# Patient Record
Sex: MALE | Race: WHITE | NOT HISPANIC OR LATINO | URBAN - METROPOLITAN AREA
[De-identification: names, ages, dates, MRNs, and addresses within clinical notes are randomized per-mention and may not be internally consistent; named-entity substitution may affect disease eponyms.]

---

## 2017-01-04 ENCOUNTER — HOSPITAL ENCOUNTER (OUTPATIENT)
Dept: FAMILY MEDICINE CLINIC | Facility: CLINIC | Age: 77
Setting detail: SPECIMEN
Discharge: HOME | End: 2017-01-04
Attending: PREVENTIVE MEDICINE | Admitting: PREVENTIVE MEDICINE

## 2017-01-04 LAB
ALBUMIN SERPL-MCNC: 3.8 G/DL (ref 3.5–4.8)
ALBUMIN/GLOB SERPL: 1.5 {RATIO} (ref 1–1.7)
ALP SERPL-CCNC: 53 IU/L (ref 32–91)
ALT SERPL-CCNC: 27 IU/L (ref 17–63)
ANION GAP SERPL CALC-SCNC: 10 MMOL/L (ref 10–20)
AST SERPL-CCNC: 30 IU/L (ref 15–41)
BASOPHILS # BLD AUTO: 0 10*3/UL (ref 0–0.2)
BASOPHILS NFR BLD AUTO: 1 % (ref 0–2)
BILIRUB SERPL-MCNC: 1.7 MG/DL (ref 0.3–1.2)
BUN SERPL-MCNC: 23 MG/DL (ref 8–20)
BUN/CREAT SERPL: 20.9 (ref 6.2–20.3)
CALCIUM SERPL-MCNC: 9.4 MG/DL (ref 8.9–10.3)
CHLORIDE SERPL-SCNC: 103 MMOL/L (ref 101–111)
CONV CO2: 28 MMOL/L (ref 22–32)
CONV TOTAL PROTEIN: 6.3 G/DL (ref 6.1–7.9)
CREAT UR-MCNC: 1.1 MG/DL (ref 0.7–1.2)
DIFFERENTIAL METHOD BLD: (no result)
EOSINOPHIL # BLD AUTO: 0.1 10*3/UL (ref 0–0.3)
EOSINOPHIL # BLD AUTO: 1 % (ref 0–3)
ERYTHROCYTE [DISTWIDTH] IN BLOOD BY AUTOMATED COUNT: 12.9 % (ref 11.5–14.5)
ERYTHROCYTE [SEDIMENTATION RATE] IN BLOOD BY WESTERGREN METHOD: 17 MM/HR (ref 0–20)
GLOBULIN UR ELPH-MCNC: 2.5 G/DL (ref 2.5–3.8)
GLUCOSE SERPL-MCNC: 108 MG/DL (ref 65–99)
HCT VFR BLD AUTO: 43.1 % (ref 40–54)
HGB BLD-MCNC: 14.8 G/DL (ref 14–18)
LYMPHOCYTES # BLD AUTO: 1.1 10*3/UL (ref 0.8–4.8)
LYMPHOCYTES NFR BLD AUTO: 21 % (ref 18–42)
MCH RBC QN AUTO: 33.2 PG (ref 26–32)
MCHC RBC AUTO-ENTMCNC: 34.3 G/DL (ref 32–36)
MCV RBC AUTO: 96.7 FL (ref 80–94)
MONOCYTES # BLD AUTO: 0.5 10*3/UL (ref 0.1–1.3)
MONOCYTES NFR BLD AUTO: 9 % (ref 2–11)
NEUTROPHILS # BLD AUTO: 3.6 10*3/UL (ref 2.3–8.6)
NEUTROPHILS NFR BLD AUTO: 68 % (ref 50–75)
NRBC BLD AUTO-RTO: 0 /100{WBCS}
NRBC/RBC NFR BLD MANUAL: 0 10*3/UL
PLATELET # BLD AUTO: 236 10*3/UL (ref 150–450)
PMV BLD AUTO: 8.7 FL (ref 7.4–10.4)
POTASSIUM SERPL-SCNC: 4 MMOL/L (ref 3.6–5.1)
RBC # BLD AUTO: 4.45 10*6/UL (ref 4.6–6)
SODIUM SERPL-SCNC: 137 MMOL/L (ref 136–144)
WBC # BLD AUTO: 5.3 10*3/UL (ref 4.5–11.5)

## 2017-01-05 ENCOUNTER — HOSPITAL ENCOUNTER (OUTPATIENT)
Dept: LAB | Facility: HOSPITAL | Age: 77
Discharge: HOME | End: 2017-01-05
Attending: PREVENTIVE MEDICINE | Admitting: PREVENTIVE MEDICINE

## 2017-01-05 LAB
BACTERIA SPEC AEROBE CULT: NORMAL
CONV TEST ORDERED:: NORMAL
CRYPTOSP AG STL QL IA: NEGATIVE
Lab: NORMAL
Lab: NORMAL
MICRO REPORT STATUS: NORMAL
SPECIMEN SOURCE: NORMAL

## 2017-01-06 ENCOUNTER — HOSPITAL ENCOUNTER (OUTPATIENT)
Dept: LAB | Facility: HOSPITAL | Age: 77
Discharge: HOME | End: 2017-01-06
Attending: PREVENTIVE MEDICINE | Admitting: PREVENTIVE MEDICINE

## 2017-01-06 LAB
CONV TEST ORDERED:: NORMAL
Lab: NORMAL

## 2017-11-21 ENCOUNTER — OFFICE (AMBULATORY)
Dept: URBAN - METROPOLITAN AREA CLINIC 64 | Facility: CLINIC | Age: 77
End: 2017-11-21

## 2017-11-21 VITALS
SYSTOLIC BLOOD PRESSURE: 150 MMHG | DIASTOLIC BLOOD PRESSURE: 82 MMHG | WEIGHT: 148 LBS | HEIGHT: 68 IN | HEART RATE: 54 BPM

## 2017-11-21 DIAGNOSIS — Z80.0 FAMILY HISTORY OF MALIGNANT NEOPLASM OF DIGESTIVE ORGANS: ICD-10-CM

## 2017-11-21 DIAGNOSIS — Z86.010 PERSONAL HISTORY OF COLONIC POLYPS: ICD-10-CM

## 2017-11-21 DIAGNOSIS — K62.5 HEMORRHAGE OF ANUS AND RECTUM: ICD-10-CM

## 2017-11-21 PROCEDURE — 99202 OFFICE O/P NEW SF 15 MIN: CPT

## 2017-12-07 ENCOUNTER — ON CAMPUS - OUTPATIENT (AMBULATORY)
Dept: URBAN - METROPOLITAN AREA HOSPITAL 2 | Facility: HOSPITAL | Age: 77
End: 2017-12-07

## 2017-12-07 VITALS
DIASTOLIC BLOOD PRESSURE: 58 MMHG | RESPIRATION RATE: 18 BRPM | HEART RATE: 45 BPM | SYSTOLIC BLOOD PRESSURE: 132 MMHG | HEART RATE: 46 BPM | SYSTOLIC BLOOD PRESSURE: 107 MMHG | OXYGEN SATURATION: 99 % | SYSTOLIC BLOOD PRESSURE: 105 MMHG | TEMPERATURE: 97.3 F | HEART RATE: 52 BPM | HEIGHT: 68 IN | DIASTOLIC BLOOD PRESSURE: 66 MMHG | RESPIRATION RATE: 16 BRPM | SYSTOLIC BLOOD PRESSURE: 98 MMHG | SYSTOLIC BLOOD PRESSURE: 155 MMHG | WEIGHT: 150 LBS | SYSTOLIC BLOOD PRESSURE: 89 MMHG | HEART RATE: 50 BPM | DIASTOLIC BLOOD PRESSURE: 79 MMHG | OXYGEN SATURATION: 96 % | SYSTOLIC BLOOD PRESSURE: 136 MMHG | HEART RATE: 57 BPM | OXYGEN SATURATION: 100 % | DIASTOLIC BLOOD PRESSURE: 55 MMHG | DIASTOLIC BLOOD PRESSURE: 51 MMHG

## 2017-12-07 DIAGNOSIS — K62.5 HEMORRHAGE OF ANUS AND RECTUM: ICD-10-CM

## 2017-12-07 DIAGNOSIS — Z86.010 PERSONAL HISTORY OF COLONIC POLYPS: ICD-10-CM

## 2017-12-07 DIAGNOSIS — K64.8 OTHER HEMORRHOIDS: ICD-10-CM

## 2017-12-07 PROCEDURE — 45378 DIAGNOSTIC COLONOSCOPY: CPT

## 2017-12-07 RX ADMIN — PROPOFOL: 10 INJECTION, EMULSION INTRAVENOUS at 07:30

## 2018-11-23 ENCOUNTER — HOSPITAL ENCOUNTER (OUTPATIENT)
Dept: ULTRASOUND IMAGING | Facility: HOSPITAL | Age: 78
Discharge: HOME | End: 2018-11-23
Attending: PREVENTIVE MEDICINE | Admitting: PREVENTIVE MEDICINE

## 2019-06-01 ENCOUNTER — TRANSCRIBE ORDERS (OUTPATIENT)
Dept: CARDIOLOGY | Facility: CLINIC | Age: 79
End: 2019-06-01

## 2019-06-01 DIAGNOSIS — R07.9 CHEST PAIN, UNSPECIFIED TYPE: Primary | ICD-10-CM

## 2019-06-28 ENCOUNTER — LAB (OUTPATIENT)
Dept: LAB | Facility: HOSPITAL | Age: 79
End: 2019-06-28

## 2019-06-28 ENCOUNTER — TRANSCRIBE ORDERS (OUTPATIENT)
Dept: ADMINISTRATIVE | Facility: HOSPITAL | Age: 79
End: 2019-06-28

## 2019-06-28 DIAGNOSIS — E87.6 HYPOKALEMIA: ICD-10-CM

## 2019-06-28 DIAGNOSIS — I10 HYPERTENSION, UNSPECIFIED TYPE: ICD-10-CM

## 2019-06-28 DIAGNOSIS — E87.6 HYPOKALEMIA: Primary | ICD-10-CM

## 2019-06-28 LAB
ANION GAP SERPL CALCULATED.3IONS-SCNC: 14.3 MMOL/L (ref 10–20)
BUN BLD-MCNC: 25 MG/DL (ref 8–20)
BUN/CREAT SERPL: 22.7 (ref 6.2–20.3)
CALCIUM SPEC-SCNC: 9.2 MG/DL (ref 8.9–10.3)
CHLORIDE SERPL-SCNC: 105 MMOL/L (ref 101–111)
CO2 SERPL-SCNC: 25 MMOL/L (ref 22–32)
CREAT BLD-MCNC: 1.1 MG/DL (ref 0.7–1.2)
GFR SERPL CREATININE-BSD FRML MDRD: 65 ML/MIN/1.73
GLUCOSE BLD-MCNC: 145 MG/DL (ref 65–99)
POTASSIUM BLD-SCNC: 4.3 MMOL/L (ref 3.6–5.1)
SODIUM BLD-SCNC: 140 MMOL/L (ref 136–144)

## 2019-06-28 PROCEDURE — 36415 COLL VENOUS BLD VENIPUNCTURE: CPT

## 2019-06-28 PROCEDURE — 80048 BASIC METABOLIC PNL TOTAL CA: CPT

## 2019-06-28 RX ORDER — ASPIRIN 81 MG/1
81 TABLET ORAL DAILY
COMMUNITY
Start: 2016-06-23

## 2019-06-28 RX ORDER — PREDNISOLONE ACETATE 10 MG/ML
1 SUSPENSION/ DROPS OPHTHALMIC DAILY
Refills: 0 | COMMUNITY
Start: 2019-06-04

## 2019-06-28 RX ORDER — PHENOL 1.4 %
600 AEROSOL, SPRAY (ML) MUCOUS MEMBRANE DAILY
COMMUNITY
Start: 2017-01-04

## 2019-06-28 RX ORDER — TELMISARTAN AND HYDROCHLORTHIAZIDE 80; 12.5 MG/1; MG/1
1 TABLET ORAL DAILY
Refills: 2 | COMMUNITY
Start: 2019-04-02

## 2019-06-28 RX ORDER — CHOLECALCIFEROL (VITAMIN D3) 25 MCG
1 CAPSULE ORAL EVERY 24 HOURS
COMMUNITY
Start: 2018-12-07

## 2019-06-28 RX ORDER — QUINIDINE SULFATE 200 MG
TABLET ORAL
COMMUNITY
Start: 2017-01-04

## 2019-06-28 RX ORDER — FLUOROMETHOLONE 0.1 %
0.1 SUSPENSION, DROPS(FINAL DOSAGE FORM)(ML) OPHTHALMIC (EYE) DAILY
Refills: 0 | COMMUNITY
Start: 2019-05-14 | End: 2019-06-28 | Stop reason: SDUPTHER

## 2019-06-28 RX ORDER — MULTIVIT-MIN/FA/LYCOPEN/LUTEIN .4-300-25
TABLET ORAL
COMMUNITY
Start: 2016-06-23

## 2019-06-28 RX ORDER — OMEGA-3 FATTY ACIDS CAP DELAYED RELEASE 1000 MG 1000 MG
CAPSULE DELAYED RELEASE ORAL
COMMUNITY
Start: 2016-06-23

## 2019-07-01 ENCOUNTER — HOSPITAL ENCOUNTER (OUTPATIENT)
Dept: CARDIOLOGY | Facility: HOSPITAL | Age: 79
Discharge: HOME | End: 2019-07-01
Admitting: INTERNAL MEDICINE

## 2019-07-01 ENCOUNTER — OFFICE VISIT (OUTPATIENT)
Dept: CARDIOLOGY | Facility: CLINIC | Age: 79
End: 2019-07-01

## 2019-07-01 VITALS
BODY MASS INDEX: 23.04 KG/M2 | SYSTOLIC BLOOD PRESSURE: 183 MMHG | HEIGHT: 68 IN | HEART RATE: 50 BPM | OXYGEN SATURATION: 98 % | WEIGHT: 152 LBS | DIASTOLIC BLOOD PRESSURE: 96 MMHG

## 2019-07-01 VITALS
SYSTOLIC BLOOD PRESSURE: 155 MMHG | BODY MASS INDEX: 21.98 KG/M2 | HEIGHT: 68 IN | WEIGHT: 145 LBS | DIASTOLIC BLOOD PRESSURE: 55 MMHG | HEART RATE: 54 BPM

## 2019-07-01 DIAGNOSIS — I47.1 PSVT (PAROXYSMAL SUPRAVENTRICULAR TACHYCARDIA) (HCC): ICD-10-CM

## 2019-07-01 DIAGNOSIS — I10 ESSENTIAL HYPERTENSION: ICD-10-CM

## 2019-07-01 DIAGNOSIS — R00.2 PALPITATIONS: Primary | ICD-10-CM

## 2019-07-01 PROCEDURE — 99213 OFFICE O/P EST LOW 20 MIN: CPT | Performed by: INTERNAL MEDICINE

## 2019-07-01 PROCEDURE — 93306 TTE W/DOPPLER COMPLETE: CPT

## 2019-07-03 LAB
BH CV ECHO MEAS - AI DEC SLOPE: 137.3 CM/SEC^2
BH CV ECHO MEAS - AI DEC TIME: 3.2 SEC
BH CV ECHO MEAS - AI MAX PG: 78.8 MMHG
BH CV ECHO MEAS - AI MAX VEL: 443.7 CM/SEC
BH CV ECHO MEAS - AI P1/2T: 946.3 MSEC
BH CV ECHO MEAS - AO MAX PG (FULL): 2 MMHG
BH CV ECHO MEAS - AO MAX PG: 8.2 MMHG
BH CV ECHO MEAS - AO MEAN PG (FULL): 1.7 MMHG
BH CV ECHO MEAS - AO MEAN PG: 4.5 MMHG
BH CV ECHO MEAS - AO ROOT AREA (BSA CORRECTED): 2.1
BH CV ECHO MEAS - AO ROOT AREA: 11 CM^2
BH CV ECHO MEAS - AO ROOT DIAM: 3.7 CM
BH CV ECHO MEAS - AO V2 MAX: 143.5 CM/SEC
BH CV ECHO MEAS - AO V2 MEAN: 102.2 CM/SEC
BH CV ECHO MEAS - AO V2 VTI: 31.8 CM
BH CV ECHO MEAS - ASC AORTA: 2.8 CM
BH CV ECHO MEAS - AVA(I,A): 3.1 CM^2
BH CV ECHO MEAS - AVA(I,D): 3.1 CM^2
BH CV ECHO MEAS - AVA(V,A): 3.1 CM^2
BH CV ECHO MEAS - AVA(V,D): 3.1 CM^2
BH CV ECHO MEAS - BSA(HAYCOCK): 1.8 M^2
BH CV ECHO MEAS - BSA: 1.8 M^2
BH CV ECHO MEAS - BZI_BMI: 22 KILOGRAMS/M^2
BH CV ECHO MEAS - BZI_METRIC_HEIGHT: 172.7 CM
BH CV ECHO MEAS - BZI_METRIC_WEIGHT: 65.8 KG
BH CV ECHO MEAS - EDV(CUBED): 57 ML
BH CV ECHO MEAS - EDV(MOD-SP2): 33.5 ML
BH CV ECHO MEAS - EDV(MOD-SP4): 52.2 ML
BH CV ECHO MEAS - EDV(TEICH): 63.9 ML
BH CV ECHO MEAS - EF(CUBED): 36.8 %
BH CV ECHO MEAS - EF(MOD-BP): 57 %
BH CV ECHO MEAS - EF(MOD-SP2): 54.9 %
BH CV ECHO MEAS - EF(MOD-SP4): 55.2 %
BH CV ECHO MEAS - EF(TEICH): 30.8 %
BH CV ECHO MEAS - ESV(CUBED): 36 ML
BH CV ECHO MEAS - ESV(MOD-SP2): 15.1 ML
BH CV ECHO MEAS - ESV(MOD-SP4): 23.4 ML
BH CV ECHO MEAS - ESV(TEICH): 44.2 ML
BH CV ECHO MEAS - FS: 14.2 %
BH CV ECHO MEAS - IVS/LVPW: 1.2
BH CV ECHO MEAS - IVSD: 1.6 CM
BH CV ECHO MEAS - LA DIMENSION(2D): 3.5 CM
BH CV ECHO MEAS - LV DIASTOLIC VOL/BSA (35-75): 29.3 ML/M^2
BH CV ECHO MEAS - LV MASS(C)D: 206.5 GRAMS
BH CV ECHO MEAS - LV MASS(C)DI: 115.8 GRAMS/M^2
BH CV ECHO MEAS - LV MAX PG: 6.2 MMHG
BH CV ECHO MEAS - LV MEAN PG: 2.8 MMHG
BH CV ECHO MEAS - LV SYSTOLIC VOL/BSA (12-30): 13.1 ML/M^2
BH CV ECHO MEAS - LV V1 MAX: 124.4 CM/SEC
BH CV ECHO MEAS - LV V1 MEAN: 75.7 CM/SEC
BH CV ECHO MEAS - LV V1 VTI: 28 CM
BH CV ECHO MEAS - LVIDD: 3.8 CM
BH CV ECHO MEAS - LVIDS: 3.3 CM
BH CV ECHO MEAS - LVOT AREA: 3.5 CM^2
BH CV ECHO MEAS - LVOT DIAM: 2.1 CM
BH CV ECHO MEAS - LVPWD: 1.3 CM
BH CV ECHO MEAS - MR MAX PG: 139.4 MMHG
BH CV ECHO MEAS - MR MAX VEL: 590.3 CM/SEC
BH CV ECHO MEAS - MR MEAN PG: 113.5 MMHG
BH CV ECHO MEAS - MR MEAN VEL: 522.9 CM/SEC
BH CV ECHO MEAS - MR VTI: 246.1 CM
BH CV ECHO MEAS - MV A MAX VEL: 88 CM/SEC
BH CV ECHO MEAS - MV DEC SLOPE: 347.4 CM/SEC^2
BH CV ECHO MEAS - MV DEC TIME: 0.22 SEC
BH CV ECHO MEAS - MV E MAX VEL: 75 CM/SEC
BH CV ECHO MEAS - MV E/A: 0.85
BH CV ECHO MEAS - MV MAX PG: 4.4 MMHG
BH CV ECHO MEAS - MV MEAN PG: 1.5 MMHG
BH CV ECHO MEAS - MV V2 MAX: 104.5 CM/SEC
BH CV ECHO MEAS - MV V2 MEAN: 54.7 CM/SEC
BH CV ECHO MEAS - MV V2 VTI: 38.8 CM
BH CV ECHO MEAS - MVA(VTI): 2.6 CM^2
BH CV ECHO MEAS - PA MAX PG: 3 MMHG
BH CV ECHO MEAS - PA MEAN PG: 2 MMHG
BH CV ECHO MEAS - PA V2 MAX: 87.2 CM/SEC
BH CV ECHO MEAS - PA V2 MEAN: 67.6 CM/SEC
BH CV ECHO MEAS - PA V2 VTI: 21.7 CM
BH CV ECHO MEAS - PI END-D VEL: 78.9 CM/SEC
BH CV ECHO MEAS - PI MAX PG: 13.3 MMHG
BH CV ECHO MEAS - PI MAX VEL: 182.5 CM/SEC
BH CV ECHO MEAS - RAP SYSTOLE: 5 MMHG
BH CV ECHO MEAS - RVDD: 3.5 CM
BH CV ECHO MEAS - RVSP: 24.1 MMHG
BH CV ECHO MEAS - SI(AO): 195.8 ML/M^2
BH CV ECHO MEAS - SI(CUBED): 11.8 ML/M^2
BH CV ECHO MEAS - SI(LVOT): 55.5 ML/M^2
BH CV ECHO MEAS - SI(MOD-SP2): 10.3 ML/M^2
BH CV ECHO MEAS - SI(MOD-SP4): 16.1 ML/M^2
BH CV ECHO MEAS - SI(TEICH): 11 ML/M^2
BH CV ECHO MEAS - SV(AO): 349 ML
BH CV ECHO MEAS - SV(CUBED): 21 ML
BH CV ECHO MEAS - SV(LVOT): 99 ML
BH CV ECHO MEAS - SV(MOD-SP2): 18.4 ML
BH CV ECHO MEAS - SV(MOD-SP4): 28.8 ML
BH CV ECHO MEAS - SV(TEICH): 19.7 ML
BH CV ECHO MEAS - TR MAX VEL: 218.4 CM/SEC
LV EF 2D ECHO EST: 60 %
MAXIMAL PREDICTED HEART RATE: 142 BPM
STRESS TARGET HR: 121 BPM

## 2019-07-03 PROCEDURE — 93306 TTE W/DOPPLER COMPLETE: CPT | Performed by: INTERNAL MEDICINE

## 2020-05-28 ENCOUNTER — OFFICE VISIT (OUTPATIENT)
Dept: FAMILY MEDICINE CLINIC | Facility: CLINIC | Age: 80
End: 2020-05-28

## 2020-05-28 VITALS
HEIGHT: 68 IN | BODY MASS INDEX: 22.52 KG/M2 | WEIGHT: 148.6 LBS | OXYGEN SATURATION: 98 % | SYSTOLIC BLOOD PRESSURE: 170 MMHG | DIASTOLIC BLOOD PRESSURE: 89 MMHG | TEMPERATURE: 96.9 F | RESPIRATION RATE: 16 BRPM | HEART RATE: 79 BPM

## 2020-05-28 DIAGNOSIS — I10 ESSENTIAL HYPERTENSION: Primary | ICD-10-CM

## 2020-05-28 DIAGNOSIS — J30.1 SEASONAL ALLERGIC RHINITIS DUE TO POLLEN: ICD-10-CM

## 2020-05-28 DIAGNOSIS — Z85.46 PERSONAL HISTORY OF PROSTATE CANCER: ICD-10-CM

## 2020-05-28 DIAGNOSIS — H69.81 EUSTACHIAN TUBE DYSFUNCTION, RIGHT: ICD-10-CM

## 2020-05-28 DIAGNOSIS — R73.9 HYPERGLYCEMIA: ICD-10-CM

## 2020-05-28 DIAGNOSIS — E78.5 HYPERLIPIDEMIA, UNSPECIFIED HYPERLIPIDEMIA TYPE: ICD-10-CM

## 2020-05-28 LAB — HBA1C MFR BLD: 5.2 % (ref 3.5–5.6)

## 2020-05-28 PROCEDURE — 83036 HEMOGLOBIN GLYCOSYLATED A1C: CPT | Performed by: NURSE PRACTITIONER

## 2020-05-28 PROCEDURE — 80061 LIPID PANEL: CPT | Performed by: NURSE PRACTITIONER

## 2020-05-28 PROCEDURE — 85025 COMPLETE CBC W/AUTO DIFF WBC: CPT | Performed by: NURSE PRACTITIONER

## 2020-05-28 PROCEDURE — 80053 COMPREHEN METABOLIC PANEL: CPT | Performed by: NURSE PRACTITIONER

## 2020-05-28 PROCEDURE — 99214 OFFICE O/P EST MOD 30 MIN: CPT | Performed by: NURSE PRACTITIONER

## 2020-05-28 PROCEDURE — G0439 PPPS, SUBSEQ VISIT: HCPCS | Performed by: NURSE PRACTITIONER

## 2020-05-28 PROCEDURE — 36415 COLL VENOUS BLD VENIPUNCTURE: CPT | Performed by: NURSE PRACTITIONER

## 2020-05-28 RX ORDER — AZELASTINE 1 MG/ML
1 SPRAY, METERED NASAL 2 TIMES DAILY
COMMUNITY
Start: 2020-03-11

## 2020-05-28 RX ORDER — TACROLIMUS 1 MG/G
OINTMENT TOPICAL
COMMUNITY
Start: 2019-09-25

## 2020-05-28 NOTE — PROGRESS NOTES
"Subjective   Anthony Lugo is a 80 y.o. male presents for   Chief Complaint   Patient presents with   • Medicare Wellness-subsequent   • Hypertension   • Hyperlipidemia       Health Maintenance Due   Topic Date Due   • TDAP/TD VACCINES (1 - Tdap) 01/18/1951   • Pneumococcal Vaccine Once at 65 Years Old  01/18/2006   • ZOSTER VACCINE (1 of 2) 11/20/2007   • HEPATITIS C SCREENING  06/20/2019   • LIPID PANEL  11/14/2019       History of Present Illness   Pt present for follow up HTN, Hyperlipidemia and medicare wellness.  Pt reports checking bloodpressure at home and states it is typically 130s/70s but runs high when coming to the office.  Pt also reports he is seen at [facility] yearly and is scheduled in August for yearly follow up.  Pt reports recent flare-up with allergy and sinus symptoms at was treated with Z-pack last month. He also reports experiencing chest pain in [place] in March and was evaluated and diagnosed with GERD.  He states symptoms are stable on PPI.  He reports feeling well at this time.  Vitals:    05/28/20 0843 05/28/20 0844   BP: 163/90 170/89   BP Location: Left arm Right arm   Patient Position: Sitting Sitting   Cuff Size: Adult Adult   Pulse: 56 79   Resp: 16    Temp: 96.9 °F (36.1 °C)    TempSrc: Temporal    SpO2: 98%    Weight: 67.4 kg (148 lb 9.6 oz)    Height: 172 cm (67.72\")      Body mass index is 22.78 kg/m².    Current Outpatient Medications on File Prior to Visit   Medication Sig Dispense Refill   • aspirin 81 MG EC tablet Take 81 mg by mouth Daily.     • azelastine (ASTELIN) 0.1 % nasal spray 1 spray into the nostril(s) as directed by provider 2 (Two) Times a Day.     • calcium carbonate (CALCIUM 600) 600 MG tablet Take 600 mg by mouth Daily.     • Cholecalciferol (VITAMIN D-3) 1000 units capsule 1 capsule Daily.     • Co-Enzyme Q-10 100 MG capsule Take 100 mg by mouth Daily.     • Multiple Vitamins-Minerals (CENTRUM SILVER ADULT 50+) tablet CENTRUM SILVER ADULT 50+ TABS     • " Omega-3 Fatty Acids (FISH OIL) 1000 MG capsule delayed-release FISH OIL 1000 MG CPDR     • prednisoLONE acetate (PRED FORTE) 1 % ophthalmic suspension Apply 1 % to eye(s) as directed by provider Daily.  0   • Specialty Vitamins Products (HEALTHY HEART COMPLEX) tablet HEALTHY HEART COMPLEX TABS     • tacrolimus (PROTOPIC) 0.1 % ointment Apply  topically to the appropriate area as directed.     • telmisartan-hydrochlorothiazide (MICARDIS HCT) 80-12.5 MG per tablet Take 1 tablet by mouth Daily.  2     No current facility-administered medications on file prior to visit.        The following portions of the patient's history were reviewed and updated as appropriate: allergies, current medications, past family history, past medical history, past social history, past surgical history and problem list.    Review of Systems   Constitutional: Negative for chills and fever.   HENT: Negative for sinus pressure and sore throat.    Eyes: Negative for blurred vision.   Respiratory: Negative for cough and shortness of breath.    Cardiovascular: Negative for chest pain.   Gastrointestinal: Positive for GERD. Negative for abdominal pain.   Endocrine: Negative.    Genitourinary: Negative.    Musculoskeletal: Negative for arthralgias and joint swelling.   Skin: Negative for color change.   Allergic/Immunologic: Positive for environmental allergies.   Neurological: Negative for dizziness.   Psychiatric/Behavioral: Negative for behavioral problems.       Objective   Physical Exam   Constitutional: He is oriented to person, place, and time. He appears well-developed and well-nourished.   HENT:   Head: Normocephalic and atraumatic.   Right Ear: Hearing, tympanic membrane, external ear and ear canal normal.   Left Ear: Hearing, external ear and ear canal normal. A middle ear effusion is present.   Nose: Nose normal.   Eyes: Pupils are equal, round, and reactive to light. Conjunctivae and EOM are normal.   Neck: Normal range of motion. Neck  supple. No thyromegaly present.   Cardiovascular: Normal rate, regular rhythm, normal heart sounds and intact distal pulses.   Pulmonary/Chest: Effort normal and breath sounds normal.   Abdominal: Soft. Bowel sounds are normal.   Musculoskeletal: Normal range of motion.   Lymphadenopathy:     He has no cervical adenopathy.   Neurological: He is alert and oriented to person, place, and time.   Skin: Skin is warm and dry.   Psychiatric: He has a normal mood and affect. His behavior is normal. Judgment and thought content normal.   Nursing note and vitals reviewed.    PHQ-9 Total Score: 0    Assessment/Plan   Anthony was seen today for medicare wellness-subsequent, hypertension and hyperlipidemia.    Diagnoses and all orders for this visit:    Essential hypertension  Comments:  monitor at home and call readings to office, DASH diet encouraged    Hyperlipidemia, unspecified hyperlipidemia type    Hyperglycemia    Personal history of prostate cancer    Eustachian tube dysfunction, right  Comments:  zyrtec prn    Seasonal allergic rhinitis due to pollen  Comments:  zyrtec prn, sinus rinses encouraged        Patient Instructions   Monitor BP at home x 2 weeks and call results to office    How to Perform a Sinus Rinse  A sinus rinse is a home treatment. It rinses your sinuses with a mixture of salt and water (saline solution). Sinuses are air-filled spaces in your skull behind the bones of your face and forehead. They open into your nasal cavity.  A sinus rinse can help to clear your nasal cavity. It can clear mucus, dirt, dust, or pollen.  You may do a sinus rinse when you have:  · A cold.  · A virus.  · Allergies.  · A sinus infection.  · A stuffy nose.  Talk with your doctor about whether a sinus rinse might help you.  What are the risks?  A sinus rinse is normally very safe and helpful. However, there are a few risks. These include:  · A burning feeling in the sinuses. This may happen if you do not make the saline  solution as instructed. Be sure to follow all directions when making the saline solution.  · Nasal irritation.  · Infection from unclean water. This is rare, but possible.  Do not do a sinus rinse if you have had:  · Ear or nasal surgery.  · An ear infection.  · Blocked ears.  Supplies needed:  · Saline solution or powder.  · Distilled or germ-free (sterile) water may be needed to mix with saline powder.  ? You may use boiled and cooled tap water. Boil tap water for 5 minutes; cool until it is lukewarm. Use within 24 hours.  ? Do not use regular tap water to mix with the saline solution.  · Neti pot or nasal rinse bottle. This releases the saline solution into your nose and through your sinuses. You can buy neti pots and rinse bottles:  ? At your local pharmacy.  ? At a [employer] food store.  ? Online.  How to perform a sinus rinse    1. Wash your hands with soap and water.  2. Wash your device using the directions that came with it.  3. Dry your device.  4. Use the solution that comes with your device or one that is sold separately in stores. Follow the mixing directions on the package if you need to mix with sterile or distilled water.  5. Fill your device with the amount of saline solution stated in the device instructions.  6. Stand over a sink and tilt your head sideways over the sink.  7. Place the spout of the device in your upper nostril (the one closer to the ceiling).  8. Gently pour or squeeze the saline solution into your nasal cavity. The liquid should drain to your lower nostril if you are not too stuffed up (congested).  9. While rinsing, breathe through your open mouth.  10. Gently blow your nose to clear any mucus and rinse solution. Blowing too hard may cause ear pain.  11. Repeat in your other nostril.  12. Clean and rinse your device with clean water.  13. Air-dry your device.  Talk with your doctor or pharmacist if you have questions about how to do a sinus rinse.  Summary  · A sinus rinse is a  home treatment. It rinses your sinuses with a mixture of salt and water (saline solution).  · A sinus rinse is normally very safe and helpful. Follow all instructions carefully.  · Talk with your doctor about whether a sinus rinse might help you.  This information is not intended to replace advice given to you by your health care provider. Make sure you discuss any questions you have with your health care provider.  Document Released: 07/15/2015 Document Revised: 10/15/2018 Document Reviewed: 10/15/2018  Elsevier Patient Education © 2020 [employer]

## 2020-05-28 NOTE — PROGRESS NOTES
The ABCs of the Annual Wellness Visit  Subsequent Medicare Wellness Visit    Chief Complaint   Patient presents with   • Medicare Wellness-subsequent   • Hypertension   • Hyperlipidemia       Subjective   History of Present Illness:  Anthony Lugo is a 80 y.o. male who presents for a Subsequent Medicare Wellness Visit.    HEALTH RISK ASSESSMENT    Recent Hospitalizations:  No hospitalization(s) within the last year.    Current Medical Providers:  Patient Care Team:  Valeri Villanueva MD as PCP - General  León Adame MD as Consulting Physician (Cardiology)    Smoking Status:  Social History     Tobacco Use   Smoking Status Never Smoker   Smokeless Tobacco Never Used       Alcohol Consumption:  Social History     Substance and Sexual Activity   Alcohol Use Yes    Comment: Social       Depression Screen:   PHQ-2/PHQ-9 Depression Screening 5/28/2020   Little interest or pleasure in doing things 0   Feeling down, depressed, or hopeless 0   Total Score 0       Fall Risk Screen:  RIKKI Fall Risk Assessment was completed, and patient is at LOW risk for falls.Assessment completed on:5/28/2020    Health Habits and Functional and Cognitive Screening:  Functional & Cognitive Status 5/28/2020   Do you have difficulty preparing food and eating? No   Do you have difficulty bathing yourself, getting dressed or grooming yourself? No   Do you have difficulty using the toilet? No   Do you have difficulty moving around from place to place? No   Do you have trouble with steps or getting out of a bed or a chair? No   Current Diet Well Balanced Diet   Dental Exam Up to date   Eye Exam Up to date   Exercise (times per week) 7 times per week   Current Exercise Activities Include Walking   Do you need help using the phone?  No   Are you deaf or do you have serious difficulty hearing?  Yes   Do you need help with transportation? No   Do you need help shopping? No   Do you need help preparing meals?  No   Do you need  help with housework?  No   Do you need help with laundry? No   Do you need help taking your medications? No   Do you need help managing money? No   Do you ever drive or ride in a car without wearing a seat belt? No   Have you felt unusual stress, anger or loneliness in the last month? No   Who do you live with? Spouse   If you need help, do you have trouble finding someone available to you? No   Have you been bothered in the last four weeks by sexual problems? No   Do you have difficulty concentrating, remembering or making decisions? No         Does the patient have evidence of cognitive impairment? Yes    Asprin use counseling:Taking ASA appropriately as indicated    Age-appropriate Screening Schedule:  Refer to the list below for future screening recommendations based on patient's age, sex and/or medical conditions. Orders for these recommended tests are listed in the plan section. The patient has been provided with a written plan.    Health Maintenance   Topic Date Due   • TDAP/TD VACCINES (1 - Tdap) 01/18/1951   • ZOSTER VACCINE (1 of 2) 11/20/2007   • LIPID PANEL  11/14/2019   • INFLUENZA VACCINE  08/01/2020          The following portions of the patient's history were reviewed and updated as appropriate: allergies, current medications, past family history, past medical history, past social history, past surgical history and problem list.    Outpatient Medications Prior to Visit   Medication Sig Dispense Refill   • aspirin 81 MG EC tablet Take 81 mg by mouth Daily.     • azelastine (ASTELIN) 0.1 % nasal spray 1 spray into the nostril(s) as directed by provider 2 (Two) Times a Day.     • calcium carbonate (CALCIUM 600) 600 MG tablet Take 600 mg by mouth Daily.     • Cholecalciferol (VITAMIN D-3) 1000 units capsule 1 capsule Daily.     • Co-Enzyme Q-10 100 MG capsule Take 100 mg by mouth Daily.     • Multiple Vitamins-Minerals (CENTRUM SILVER ADULT 50+) tablet CENTRUM SILVER ADULT 50+ TABS     • Omega-3 Fatty  "Acids (FISH OIL) 1000 MG capsule delayed-release FISH OIL 1000 MG CPDR     • prednisoLONE acetate (PRED FORTE) 1 % ophthalmic suspension Apply 1 % to eye(s) as directed by provider Daily.  0   • Specialty Vitamins Products (HEALTHY HEART COMPLEX) tablet HEALTHY HEART COMPLEX TABS     • tacrolimus (PROTOPIC) 0.1 % ointment Apply  topically to the appropriate area as directed.     • telmisartan-hydrochlorothiazide (MICARDIS HCT) 80-12.5 MG per tablet Take 1 tablet by mouth Daily.  2     No facility-administered medications prior to visit.        Patient Active Problem List   Diagnosis   • Hyperglycemia   • Hypertension   • Hyperlipidemia   • Personal history of prostate cancer       Advanced Care Planning:  ACP discussion was held with the patient during this visit. Patient has an advance directive in EMR which is still valid.     Review of Systems    Compared to one year ago, the patient feels his physical health is better.  Compared to one year ago, the patient feels his mental health is the same.    Reviewed chart for potential of high risk medication in the elderly: yes  Reviewed chart for potential of harmful drug interactions in the elderly:yes    Objective         Vitals:    05/28/20 0843 05/28/20 0844   BP: 163/90 170/89   BP Location: Left arm Right arm   Patient Position: Sitting Sitting   Cuff Size: Adult Adult   Pulse: 56 79   Resp: 16    Temp: 96.9 °F (36.1 °C)    TempSrc: Temporal    SpO2: 98%    Weight: 67.4 kg (148 lb 9.6 oz)    Height: 172 cm (67.72\")    PainSc: 0-No pain        Body mass index is 22.78 kg/m².  Discussed the patient's BMI with him. The BMI is in the acceptable range.    Physical Exam          Assessment/Plan   Medicare Risks and Personalized Health Plan  CMS Preventative Services Quick Reference  Cardiovascular risk  Depression/Dysphoria  Fall Risk  Hearing Problem    The above risks/problems have been discussed with the patient.  Pertinent information has been shared with the patient " in the After Visit Summary.  Follow up plans and orders are seen below in the Assessment/Plan Section.    Diagnoses and all orders for this visit:    1. Essential hypertension (Primary)    2. Hyperlipidemia, unspecified hyperlipidemia type    3. Hyperglycemia    4. Personal history of prostate cancer      Follow Up:  No follow-ups on file.     An After Visit Summary and PPPS were given to the patient.

## 2020-05-28 NOTE — PATIENT INSTRUCTIONS
Monitor BP at home x 2 weeks and call results to office    How to Perform a Sinus Rinse  A sinus rinse is a home treatment. It rinses your sinuses with a mixture of salt and water (saline solution). Sinuses are air-filled spaces in your skull behind the bones of your face and forehead. They open into your nasal cavity.  A sinus rinse can help to clear your nasal cavity. It can clear mucus, dirt, dust, or pollen.  You may do a sinus rinse when you have:  · A cold.  · A virus.  · Allergies.  · A sinus infection.  · A stuffy nose.  Talk with your doctor about whether a sinus rinse might help you.  What are the risks?  A sinus rinse is normally very safe and helpful. However, there are a few risks. These include:  · A burning feeling in the sinuses. This may happen if you do not make the saline solution as instructed. Be sure to follow all directions when making the saline solution.  · Nasal irritation.  · Infection from unclean water. This is rare, but possible.  Do not do a sinus rinse if you have had:  · Ear or nasal surgery.  · An ear infection.  · Blocked ears.  Supplies needed:  · Saline solution or powder.  · Distilled or germ-free (sterile) water may be needed to mix with saline powder.  ? You may use boiled and cooled tap water. Boil tap water for 5 minutes; cool until it is lukewarm. Use within 24 hours.  ? Do not use regular tap water to mix with the saline solution.  · Neti pot or nasal rinse bottle. This releases the saline solution into your nose and through your sinuses. You can buy neti pots and rinse bottles:  ? At your local pharmacy.  ? At a health food store.  ? Online.  How to perform a sinus rinse    1. Wash your hands with soap and water.  2. Wash your device using the directions that came with it.  3. Dry your device.  4. Use the solution that comes with your device or one that is sold separately in stores. Follow the mixing directions on the package if you need to mix with sterile or distilled  water.  5. Fill your device with the amount of saline solution stated in the device instructions.  6. Stand over a sink and tilt your head sideways over the sink.  7. Place the spout of the device in your upper nostril (the one closer to the ceiling).  8. Gently pour or squeeze the saline solution into your nasal cavity. The liquid should drain to your lower nostril if you are not too stuffed up (congested).  9. While rinsing, breathe through your open mouth.  10. Gently blow your nose to clear any mucus and rinse solution. Blowing too hard may cause ear pain.  11. Repeat in your other nostril.  12. Clean and rinse your device with clean water.  13. Air-dry your device.  Talk with your doctor or pharmacist if you have questions about how to do a sinus rinse.  Summary  · A sinus rinse is a home treatment. It rinses your sinuses with a mixture of salt and water (saline solution).  · A sinus rinse is normally very safe and helpful. Follow all instructions carefully.  · Talk with your doctor about whether a sinus rinse might help you.  This information is not intended to replace advice given to you by your health care provider. Make sure you discuss any questions you have with your health care provider.  Document Released: 07/15/2015 Document Revised: 10/15/2018 Document Reviewed: 10/15/2018  Elsevier Patient Education © 2020 [employer]

## 2020-05-29 LAB
ALBUMIN SERPL-MCNC: 4.3 G/DL (ref 3.5–5.2)
ALBUMIN/GLOB SERPL: 1.9 G/DL
ALP SERPL-CCNC: 55 U/L (ref 39–117)
ALT SERPL W P-5'-P-CCNC: 20 U/L (ref 1–41)
ANION GAP SERPL CALCULATED.3IONS-SCNC: 8.1 MMOL/L (ref 5–15)
AST SERPL-CCNC: 21 U/L (ref 1–40)
BASOPHILS # BLD AUTO: 0.03 10*3/MM3 (ref 0–0.2)
BASOPHILS NFR BLD AUTO: 0.6 % (ref 0–1.5)
BILIRUB SERPL-MCNC: 1.2 MG/DL (ref 0.2–1.2)
BUN BLD-MCNC: 30 MG/DL (ref 8–23)
BUN/CREAT SERPL: 29.4 (ref 7–25)
CALCIUM SPEC-SCNC: 9.3 MG/DL (ref 8.6–10.5)
CHLORIDE SERPL-SCNC: 103 MMOL/L (ref 98–107)
CHOLEST SERPL-MCNC: 214 MG/DL (ref 0–200)
CO2 SERPL-SCNC: 28.9 MMOL/L (ref 22–29)
CREAT BLD-MCNC: 1.02 MG/DL (ref 0.76–1.27)
DEPRECATED RDW RBC AUTO: 42.3 FL (ref 37–54)
EOSINOPHIL # BLD AUTO: 0.15 10*3/MM3 (ref 0–0.4)
EOSINOPHIL NFR BLD AUTO: 3.1 % (ref 0.3–6.2)
ERYTHROCYTE [DISTWIDTH] IN BLOOD BY AUTOMATED COUNT: 12 % (ref 12.3–15.4)
GFR SERPL CREATININE-BSD FRML MDRD: 70 ML/MIN/1.73
GLOBULIN UR ELPH-MCNC: 2.3 GM/DL
GLUCOSE BLD-MCNC: 101 MG/DL (ref 65–99)
HCT VFR BLD AUTO: 41.2 % (ref 37.5–51)
HDLC SERPL-MCNC: 62 MG/DL (ref 40–60)
HGB BLD-MCNC: 14.1 G/DL (ref 13–17.7)
IMM GRANULOCYTES # BLD AUTO: 0.01 10*3/MM3 (ref 0–0.05)
IMM GRANULOCYTES NFR BLD AUTO: 0.2 % (ref 0–0.5)
LDLC SERPL CALC-MCNC: 140 MG/DL (ref 0–100)
LDLC/HDLC SERPL: 2.26 {RATIO}
LYMPHOCYTES # BLD AUTO: 1.33 10*3/MM3 (ref 0.7–3.1)
LYMPHOCYTES NFR BLD AUTO: 27.5 % (ref 19.6–45.3)
MCH RBC QN AUTO: 32.6 PG (ref 26.6–33)
MCHC RBC AUTO-ENTMCNC: 34.2 G/DL (ref 31.5–35.7)
MCV RBC AUTO: 95.4 FL (ref 79–97)
MONOCYTES # BLD AUTO: 0.45 10*3/MM3 (ref 0.1–0.9)
MONOCYTES NFR BLD AUTO: 9.3 % (ref 5–12)
NEUTROPHILS # BLD AUTO: 2.87 10*3/MM3 (ref 1.7–7)
NEUTROPHILS NFR BLD AUTO: 59.3 % (ref 42.7–76)
NRBC BLD AUTO-RTO: 0 /100 WBC (ref 0–0.2)
PLATELET # BLD AUTO: 251 10*3/MM3 (ref 140–450)
PMV BLD AUTO: 10.5 FL (ref 6–12)
POTASSIUM BLD-SCNC: 3.9 MMOL/L (ref 3.5–5.2)
PROT SERPL-MCNC: 6.6 G/DL (ref 6–8.5)
RBC # BLD AUTO: 4.32 10*6/MM3 (ref 4.14–5.8)
SODIUM BLD-SCNC: 140 MMOL/L (ref 136–145)
TRIGL SERPL-MCNC: 59 MG/DL (ref 0–150)
VLDLC SERPL-MCNC: 11.8 MG/DL (ref 5–40)
WBC NRBC COR # BLD: 4.84 10*3/MM3 (ref 3.4–10.8)

## 2021-03-05 PROBLEM — H40.51X0: Status: ACTIVE | Noted: 2019-09-24

## 2021-03-05 PROBLEM — K64.9 HEMORRHOIDS: Status: ACTIVE | Noted: 2017-07-19

## 2021-03-05 PROBLEM — I47.10 PAROXYSMAL SUPRAVENTRICULAR TACHYCARDIA: Status: ACTIVE | Noted: 2018-12-07

## 2021-03-05 PROBLEM — E80.6 HYPERBILIRUBINEMIA: Status: ACTIVE | Noted: 2017-01-10

## 2021-03-05 PROBLEM — Z86.010 HISTORY OF COLONIC POLYPS: Status: ACTIVE | Noted: 2018-08-09

## 2021-03-05 PROBLEM — K76.89 LIVER NODULE: Status: ACTIVE | Noted: 2018-12-20

## 2021-03-05 PROBLEM — M25.561 KNEE PAIN, RIGHT: Status: ACTIVE | Noted: 2019-05-07

## 2021-03-05 PROBLEM — H57.89 RED EYE: Status: ACTIVE | Noted: 2019-05-07

## 2021-03-05 PROBLEM — I47.1 PAROXYSMAL SUPRAVENTRICULAR TACHYCARDIA (HCC): Status: ACTIVE | Noted: 2018-12-07

## 2021-03-05 PROBLEM — H57.89: Status: ACTIVE | Noted: 2019-09-24

## 2021-03-05 PROBLEM — H11.159 PINGUECULA: Status: ACTIVE | Noted: 2019-09-24

## 2021-03-05 PROBLEM — E87.6 HYPOKALEMIA: Status: ACTIVE | Noted: 2018-12-07

## 2021-10-13 ENCOUNTER — FLU SHOT (OUTPATIENT)
Dept: FAMILY MEDICINE CLINIC | Facility: CLINIC | Age: 81
End: 2021-10-13

## 2021-10-13 DIAGNOSIS — Z23 NEED FOR INFLUENZA VACCINATION: Primary | ICD-10-CM

## 2021-10-13 PROCEDURE — 90662 IIV NO PRSV INCREASED AG IM: CPT | Performed by: PREVENTIVE MEDICINE

## 2021-10-13 PROCEDURE — G0008 ADMIN INFLUENZA VIRUS VAC: HCPCS | Performed by: PREVENTIVE MEDICINE

## 2022-03-28 ENCOUNTER — OFFICE VISIT (OUTPATIENT)
Dept: FAMILY MEDICINE CLINIC | Facility: CLINIC | Age: 82
End: 2022-03-28

## 2022-03-28 ENCOUNTER — TELEPHONE (OUTPATIENT)
Dept: FAMILY MEDICINE CLINIC | Facility: CLINIC | Age: 82
End: 2022-03-28

## 2022-03-28 VITALS
DIASTOLIC BLOOD PRESSURE: 76 MMHG | SYSTOLIC BLOOD PRESSURE: 163 MMHG | TEMPERATURE: 98 F | HEIGHT: 68 IN | BODY MASS INDEX: 23.4 KG/M2 | WEIGHT: 154.4 LBS | OXYGEN SATURATION: 99 % | HEART RATE: 48 BPM

## 2022-03-28 DIAGNOSIS — I10 PRIMARY HYPERTENSION: ICD-10-CM

## 2022-03-28 DIAGNOSIS — R00.1 BRADYCARDIA: ICD-10-CM

## 2022-03-28 DIAGNOSIS — J01.90 ACUTE SINUSITIS, RECURRENCE NOT SPECIFIED, UNSPECIFIED LOCATION: ICD-10-CM

## 2022-03-28 DIAGNOSIS — R05.9 COUGH: Primary | ICD-10-CM

## 2022-03-28 PROCEDURE — 99213 OFFICE O/P EST LOW 20 MIN: CPT | Performed by: PREVENTIVE MEDICINE

## 2022-03-28 RX ORDER — CEFDINIR 300 MG/1
300 CAPSULE ORAL 2 TIMES DAILY
Qty: 20 CAPSULE | Refills: 0 | Status: SHIPPED | OUTPATIENT
Start: 2022-03-28

## 2022-03-28 NOTE — PROGRESS NOTES
"Subjective   Anthony Lugo is a 82 y.o. male presents for   Chief Complaint   Patient presents with   • Cough     Productive with yellow sputum. No fever, no nausea, no vomiting, or diarrhea. Usually gets sinus infections around March   • Nasal Congestion     For at least 1 week     Patient presents with an 8-day or so history of purulent upper respiratory infection both of his nose and coughing purulent sputum.  He has not really had a fever has had no chest pain or pressure no dizziness or syncope.  Had a fairly thorough cardiac work-up back in 2018.  He has been using Afrin nose spray and has been advised to not do that as it does raise his blood pressure.  He has been advised instead to use over-the-counter Flonase as well as Ceftin or antibiotics 300 mg twice daily.  He admits to no headaches heart palpitations nausea vomiting diarrhea or dysuria.  Patient does exercise regularly but a pulse rate of 48 is fairly concerning he will monitor his pulse rate and his blood pressure at home and send us any abnormal readings.  It should be noted that the patient is getting ready to fly and we do want him to call back toward the end of the week if symptoms persist we will get a chest x-ray and sinus x-rays before he flies.  Health Maintenance Due   Topic Date Due   • ANNUAL WELLNESS VISIT  05/28/2021   • COVID-19 Vaccine (3 - Booster for Moderna series) 08/12/2021       History of Present Illness     Vitals:    03/28/22 1458 03/28/22 1501 03/28/22 1502   BP: 172/74 174/73 163/76   BP Location: Right arm Right arm Left arm   Patient Position: Standing Sitting Sitting   Cuff Size: Large Adult Large Adult Large Adult   Pulse: (!) 48     Temp: 98 °F (36.7 °C)     TempSrc: Temporal     SpO2: 99%     Weight: 70 kg (154 lb 6.4 oz)     Height: 172 cm (67.72\")       Body mass index is 23.67 kg/m².    Current Outpatient Medications on File Prior to Visit   Medication Sig Dispense Refill   • aspirin 81 MG EC tablet Take 81 mg " by mouth Daily.     • azelastine (ASTELIN) 0.1 % nasal spray 1 spray into the nostril(s) as directed by provider 2 (Two) Times a Day.     • calcium carbonate (OS-CHRISTIAN) 600 MG tablet Take 600 mg by mouth Daily.     • Cholecalciferol (VITAMIN D-3) 1000 units capsule 1 capsule Daily.     • Co-Enzyme Q-10 100 MG capsule Take 100 mg by mouth Daily.     • Multiple Vitamins-Minerals (CENTRUM SILVER ADULT 50+) tablet CENTRUM SILVER ADULT 50+ TABS     • Omega-3 Fatty Acids (FISH OIL) 1000 MG capsule delayed-release FISH OIL 1000 MG CPDR     • prednisoLONE acetate (PRED FORTE) 1 % ophthalmic suspension Apply 1 % to eye(s) as directed by provider Daily.  0   • Specialty Vitamins Products (HEALTHY HEART COMPLEX) tablet HEALTHY HEART COMPLEX TABS     • tacrolimus (PROTOPIC) 0.1 % ointment Apply  topically to the appropriate area as directed.     • telmisartan-hydrochlorothiazide (MICARDIS HCT) 80-12.5 MG per tablet Take 1 tablet by mouth Daily.  2     No current facility-administered medications on file prior to visit.       The following portions of the patient's history were reviewed and updated as appropriate: allergies, current medications, past family history, past medical history, past social history, past surgical history and problem list.    Review of Systems   HENT: Positive for congestion, rhinorrhea, sinus pressure and sore throat.    Respiratory: Positive for cough. Negative for shortness of breath and wheezing.    Cardiovascular: Negative for chest pain, palpitations and leg swelling.   Gastrointestinal: Negative for abdominal pain, diarrhea, nausea and vomiting.   Genitourinary: Negative for difficulty urinating.       Objective   Physical Exam  Vitals reviewed.   Constitutional:       General: He is not in acute distress.     Appearance: Normal appearance. He is well-developed. He is not ill-appearing or toxic-appearing.   HENT:      Head: Normocephalic and atraumatic.      Right Ear: Tympanic membrane, ear canal  and external ear normal.      Left Ear: Tympanic membrane, ear canal and external ear normal.      Nose: Nose normal.      Mouth/Throat:      Mouth: Mucous membranes are moist.      Pharynx: Posterior oropharyngeal erythema present.   Eyes:      Extraocular Movements: Extraocular movements intact.      Conjunctiva/sclera: Conjunctivae normal.      Pupils: Pupils are equal, round, and reactive to light.   Cardiovascular:      Rate and Rhythm: Regular rhythm. Bradycardia present.      Heart sounds: Normal heart sounds.   Pulmonary:      Effort: Pulmonary effort is normal. No respiratory distress.      Breath sounds: No wheezing or rhonchi.   Abdominal:      General: Bowel sounds are normal. There is no distension.      Palpations: Abdomen is soft. There is no mass.      Tenderness: There is no abdominal tenderness.   Musculoskeletal:         General: No tenderness.      Cervical back: Neck supple.   Skin:     General: Skin is warm.   Neurological:      General: No focal deficit present.      Mental Status: He is alert and oriented to person, place, and time.   Psychiatric:         Mood and Affect: Mood normal.         Behavior: Behavior normal.       PHQ-9 Total Score: 0    Assessment/Plan   Diagnoses and all orders for this visit:    1. Cough (Primary)  Comments:  Cough and thick yellow-green sputum without fever lasting longer than a week.  Has had flu vaccine and COVID with booster no known exposures.    2. Acute sinusitis, recurrence not specified, unspecified location  Comments:  Thick yellow-green nasal discharge that he has been using Afrin for.  We have cautioned him not to do that with his blood pressure elevated    3. Bradycardia  Comments:  Patient works out daily and walks many steps per day.  Has had pulse rates down to 50 before we were concerned with a 48 pulse rate today he will continue to mo    4. Primary hypertension  Comments:  Blood pressure elevated today probably from the Afrin will call with  home monitoring.    Other orders  -     cefdinir (OMNICEF) 300 MG capsule; Take 1 capsule by mouth 2 (Two) Times a Day.  Dispense: 20 capsule; Refill: 0        Patient Instructions   Take. antibiotics till gone.  Drink plenty of fluids.  Call if pulse below 50 and if blood pressure 180/120.  Advise flonase for nose if still congewted with antibiotics.  Call Friday if not improved and will get xrays.

## 2022-03-28 NOTE — PATIENT INSTRUCTIONS
Take. antibiotics till gone.  Drink plenty of fluids.  Call if pulse below 50 and if blood pressure 180/120.  Advise flonase for nose if still congewted with antibiotics.  Call Friday if not improved and will get xrays.

## 2022-03-28 NOTE — TELEPHONE ENCOUNTER
Caller: Anthony Lugo    Relationship: Self    Best call back number: [phone]    What medication are you requesting: ANY MEDICATION THAT WILL HELP WITH SYMPTOMS     What are your current symptoms: RUNNY NOSE, COUGHING BROWN MUCUS, NO FEVER     How long have you been experiencing symptoms: A WEEK     Have you had these symptoms before:    [x] Yes  [] No    Have you been treated for these symptoms before:   [x] Yes  [] No    If a prescription is needed, what is your preferred pharmacy and phone number:    [id number] [address] AT [facility] - [phone] [facility] [phone]   [phone]    Additional notes: PATIENT WAS ADVISED THAT THERE IS A POSSIBILITY HE WILL NEED TO BE SEEN FIRST

## 2022-06-13 ENCOUNTER — TELEPHONE (OUTPATIENT)
Dept: FAMILY MEDICINE CLINIC | Facility: CLINIC | Age: 82
End: 2022-06-13

## 2022-06-13 DIAGNOSIS — Z86.010 HISTORY OF COLONIC POLYPS: Primary | ICD-10-CM

## 2022-06-13 NOTE — TELEPHONE ENCOUNTER
Caller: Anthony Lugo    Relationship: Self    Best call back number: [phone]    What is the medical concern/diagnosis: ROUTINE CHECK    What specialty or service is being requested: COLONOSCOPY - WOULD LIKE THIS DONE BY AUGUST, 2022 IF POSSIBLE.    What is the provider, practice or medical service name: WHOEVER HIS PCP RECOMMENDS - THE PATIENT STATES THAT THE PROVIDER WHO NORMALLY DOES HIS PROCEDURE HAS RETIRED.

## 2022-06-20 ENCOUNTER — OFFICE VISIT (OUTPATIENT)
Dept: FAMILY MEDICINE CLINIC | Facility: CLINIC | Age: 82
End: 2022-06-20

## 2022-06-20 VITALS
WEIGHT: 153 LBS | TEMPERATURE: 97.5 F | OXYGEN SATURATION: 99 % | HEART RATE: 69 BPM | DIASTOLIC BLOOD PRESSURE: 110 MMHG | SYSTOLIC BLOOD PRESSURE: 152 MMHG | BODY MASS INDEX: 23.19 KG/M2 | HEIGHT: 68 IN

## 2022-06-20 DIAGNOSIS — H61.21 EXCESSIVE CERUMEN IN RIGHT EAR CANAL: ICD-10-CM

## 2022-06-20 DIAGNOSIS — H90.3 SENSORINEURAL HEARING LOSS, BILATERAL: Primary | ICD-10-CM

## 2022-06-20 DIAGNOSIS — I10 PRIMARY HYPERTENSION: ICD-10-CM

## 2022-06-20 PROCEDURE — 99213 OFFICE O/P EST LOW 20 MIN: CPT | Performed by: PREVENTIVE MEDICINE

## 2022-06-20 PROCEDURE — 69210 REMOVE IMPACTED EAR WAX UNI: CPT | Performed by: PREVENTIVE MEDICINE

## 2022-06-20 NOTE — PROGRESS NOTES
Procedure   Ear Cerumen Removal    Date/Time: 6/20/2022 1:27 PM  Performed by: Valeri Villanueva MD  Authorized by: Valeri Villanueva MD     Anesthesia:  Local Anesthetic: none  Location details: right ear  Comments: Drum had a bubble centrally but was not erythematous and was intact.  Procedure type: instrumentation, irrigation   Sedation:  Patient sedated: no

## 2022-06-20 NOTE — PROGRESS NOTES
"Subjective   Anthony Lugo is a 82 y.o. male presents for   Chief Complaint   Patient presents with   • Hearing Problem     Right side - buzzing noise- happened slowly over the week.    • Medicare Wellness-subsequent     Patient will get his annual wellness exam done at [facility].  He states that he has always had difficulty hearing out of his left ear due to probable loud noise exposure.  But starting 4 days ago he started struggling hearing with his right ear.  He states that he noticed some tinnitus and background noise with worsening of symptoms 2 days ago.  Patient is not dizzy has had no trauma to his ears no headache or chest pain.  Health Maintenance Due   Topic Date Due   • ANNUAL WELLNESS VISIT  05/28/2021       History of Present Illness     Vitals:    06/20/22 0935 06/20/22 0936   BP: 148/84 (!) 152/110   BP Location: Right arm Left arm   Patient Position: Sitting Sitting   Cuff Size: Adult Adult   Pulse: 69    Temp: 97.5 °F (36.4 °C)    TempSrc: Temporal    SpO2: 99%    Weight: 69.4 kg (153 lb)    Height: 172 cm (67.72\")      Body mass index is 23.46 kg/m².    Current Outpatient Medications on File Prior to Visit   Medication Sig Dispense Refill   • aspirin 81 MG EC tablet Take 81 mg by mouth Daily.     • azelastine (ASTELIN) 0.1 % nasal spray 1 spray into the nostril(s) as directed by provider 2 (Two) Times a Day.     • calcium carbonate (OS-CHRISTIAN) 600 MG tablet Take 600 mg by mouth Daily.     • cefdinir (OMNICEF) 300 MG capsule Take 1 capsule by mouth 2 (Two) Times a Day. 20 capsule 0   • Cholecalciferol (VITAMIN D-3) 1000 units capsule 1 capsule Daily.     • Co-Enzyme Q-10 100 MG capsule Take 100 mg by mouth Daily.     • Multiple Vitamins-Minerals (CENTRUM SILVER ADULT 50+) tablet CENTRUM SILVER ADULT 50+ TABS     • Omega-3 Fatty Acids (FISH OIL) 1000 MG capsule delayed-release FISH OIL 1000 MG CPDR     • prednisoLONE acetate (PRED FORTE) 1 % ophthalmic suspension Apply 1 % to eye(s) as directed by " provider Daily.  0   • Specialty Vitamins Products (HEALTHY HEART COMPLEX) tablet HEALTHY HEART COMPLEX TABS     • tacrolimus (PROTOPIC) 0.1 % ointment Apply  topically to the appropriate area as directed.     • telmisartan-hydrochlorothiazide (MICARDIS HCT) 80-12.5 MG per tablet Take 1 tablet by mouth Daily.  2     No current facility-administered medications on file prior to visit.       The following portions of the patient's history were reviewed and updated as appropriate: allergies, current medications, past family history, past medical history, past social history, past surgical history and problem list.    Review of Systems   HENT: Positive for congestion, hearing loss and tinnitus. Negative for ear discharge and ear pain.    Respiratory: Negative for cough.    Cardiovascular: Negative for chest pain.   Neurological: Negative for dizziness and headache.       Objective   Physical Exam  Vitals reviewed.   Constitutional:       General: He is not in acute distress.     Appearance: Normal appearance. He is well-developed. He is not ill-appearing or toxic-appearing.   HENT:      Head: Normocephalic and atraumatic.      Right Ear: Tympanic membrane, ear canal and external ear normal. There is impacted cerumen.      Left Ear: Tympanic membrane, ear canal and external ear normal.      Ears:      Comments: Marrero lateralized to the left Rinee normal on the left no noise heard on the right.     Nose: Nose normal.      Mouth/Throat:      Mouth: Mucous membranes are moist.      Pharynx: No posterior oropharyngeal erythema.   Eyes:      Extraocular Movements: Extraocular movements intact.      Conjunctiva/sclera: Conjunctivae normal.      Pupils: Pupils are equal, round, and reactive to light.   Cardiovascular:      Rate and Rhythm: Normal rate and regular rhythm.      Heart sounds: Normal heart sounds.   Pulmonary:      Effort: Pulmonary effort is normal.      Breath sounds: Normal breath sounds.   Abdominal:       General: There is no distension.      Palpations: There is no mass.      Tenderness: There is no abdominal tenderness.   Musculoskeletal:      Cervical back: Neck supple.   Neurological:      General: No focal deficit present.      Mental Status: He is alert and oriented to person, place, and time.   Psychiatric:         Mood and Affect: Mood normal.         Behavior: Behavior normal.       PHQ-9 Total Score:      Assessment & Plan   Diagnoses and all orders for this visit:    1. Sensorineural hearing loss, bilateral (Primary)  Comments:  New profound decrease in hearing of the right ear.  Always has had some difficulty with the left but starting 2 days ago profound in the right.  Orders:  -     Ambulatory Referral to ENT (Otolaryngology)    2. Primary hypertension  Comments:  Blood pressure was not under good control here he will home monitor and send us the results.    3. Excessive cerumen in right ear canal  -     Cerumen Removal        There are no Patient Instructions on file for this visit.

## 2022-08-09 ENCOUNTER — ON CAMPUS - OUTPATIENT (AMBULATORY)
Dept: URBAN - METROPOLITAN AREA HOSPITAL 2 | Facility: HOSPITAL | Age: 82
End: 2022-08-09
Payer: COMMERCIAL

## 2022-08-09 ENCOUNTER — OFFICE (AMBULATORY)
Dept: URBAN - METROPOLITAN AREA PATHOLOGY 4 | Facility: PATHOLOGY | Age: 82
End: 2022-08-09
Payer: COMMERCIAL

## 2022-08-09 VITALS
RESPIRATION RATE: 16 BRPM | DIASTOLIC BLOOD PRESSURE: 75 MMHG | DIASTOLIC BLOOD PRESSURE: 60 MMHG | HEART RATE: 62 BPM | SYSTOLIC BLOOD PRESSURE: 95 MMHG | RESPIRATION RATE: 19 BRPM | TEMPERATURE: 97.5 F | SYSTOLIC BLOOD PRESSURE: 131 MMHG | DIASTOLIC BLOOD PRESSURE: 53 MMHG | SYSTOLIC BLOOD PRESSURE: 138 MMHG | OXYGEN SATURATION: 100 % | DIASTOLIC BLOOD PRESSURE: 50 MMHG | RESPIRATION RATE: 18 BRPM | OXYGEN SATURATION: 98 % | HEART RATE: 51 BPM | WEIGHT: 145 LBS | HEART RATE: 60 BPM | SYSTOLIC BLOOD PRESSURE: 90 MMHG | DIASTOLIC BLOOD PRESSURE: 55 MMHG | HEART RATE: 55 BPM | DIASTOLIC BLOOD PRESSURE: 46 MMHG | SYSTOLIC BLOOD PRESSURE: 155 MMHG | DIASTOLIC BLOOD PRESSURE: 51 MMHG | HEIGHT: 68 IN | HEART RATE: 53 BPM | RESPIRATION RATE: 14 BRPM | OXYGEN SATURATION: 99 % | SYSTOLIC BLOOD PRESSURE: 104 MMHG | HEART RATE: 67 BPM | SYSTOLIC BLOOD PRESSURE: 100 MMHG | HEART RATE: 59 BPM | SYSTOLIC BLOOD PRESSURE: 106 MMHG

## 2022-08-09 DIAGNOSIS — K63.5 POLYP OF COLON: ICD-10-CM

## 2022-08-09 DIAGNOSIS — K62.1 RECTAL POLYP: ICD-10-CM

## 2022-08-09 DIAGNOSIS — K64.1 SECOND DEGREE HEMORRHOIDS: ICD-10-CM

## 2022-08-09 DIAGNOSIS — D12.4 BENIGN NEOPLASM OF DESCENDING COLON: ICD-10-CM

## 2022-08-09 DIAGNOSIS — Z86.010 PERSONAL HISTORY OF COLONIC POLYPS: ICD-10-CM

## 2022-08-09 LAB
GI HISTOLOGY: A. UNSPECIFIED: (no result)
GI HISTOLOGY: B. UNSPECIFIED: (no result)
GI HISTOLOGY: C. UNSPECIFIED: (no result)
GI HISTOLOGY: PDF REPORT: (no result)

## 2022-08-09 PROCEDURE — 45385 COLONOSCOPY W/LESION REMOVAL: CPT | Mod: PT | Performed by: INTERNAL MEDICINE

## 2022-08-09 PROCEDURE — 45380 COLONOSCOPY AND BIOPSY: CPT | Mod: 59,PT | Performed by: INTERNAL MEDICINE

## 2022-08-09 PROCEDURE — 88305 TISSUE EXAM BY PATHOLOGIST: CPT | Mod: 26 | Performed by: INTERNAL MEDICINE

## 2022-09-30 ENCOUNTER — CLINICAL SUPPORT (OUTPATIENT)
Dept: FAMILY MEDICINE CLINIC | Facility: CLINIC | Age: 82
End: 2022-09-30

## 2022-09-30 DIAGNOSIS — Z23 IMMUNIZATION DUE: ICD-10-CM

## 2022-09-30 PROCEDURE — 90662 IIV NO PRSV INCREASED AG IM: CPT | Performed by: PREVENTIVE MEDICINE

## 2022-09-30 PROCEDURE — G0008 ADMIN INFLUENZA VIRUS VAC: HCPCS | Performed by: PREVENTIVE MEDICINE

## 2023-11-09 ENCOUNTER — APPOINTMENT (OUTPATIENT)
Dept: CT IMAGING | Facility: HOSPITAL | Age: 83
End: 2023-11-09
Payer: MEDICARE

## 2023-11-09 ENCOUNTER — TRANSCRIBE ORDERS (OUTPATIENT)
Dept: ADMINISTRATIVE | Facility: HOSPITAL | Age: 83
End: 2023-11-09
Payer: MEDICARE

## 2023-11-09 ENCOUNTER — HOSPITAL ENCOUNTER (OUTPATIENT)
Facility: HOSPITAL | Age: 83
Setting detail: OBSERVATION
LOS: 1 days | Discharge: HOME | End: 2023-11-11
Attending: INTERNAL MEDICINE | Admitting: INTERNAL MEDICINE
Payer: MEDICARE

## 2023-11-09 ENCOUNTER — HOSPITAL ENCOUNTER (OUTPATIENT)
Dept: CT IMAGING | Facility: HOSPITAL | Age: 83
Discharge: HOME | End: 2023-11-09
Payer: MEDICARE

## 2023-11-09 DIAGNOSIS — T14.90XA INJURY: ICD-10-CM

## 2023-11-09 DIAGNOSIS — T14.90XA INJURY: Primary | ICD-10-CM

## 2023-11-09 DIAGNOSIS — S06.6X9A SUBARACHNOID HEMORRHAGE AFTER TRAUMATIC INJURY WITHOUT OPEN INTRACRANIAL WOUND, WITH PROLONGED LOSS OF CONSCIOUSNESS AND RETURN TO PRE-EXISTING LEVEL OF CONSCIOUSNESS: Primary | ICD-10-CM

## 2023-11-09 LAB
ALBUMIN SERPL-MCNC: 3.9 G/DL (ref 3.5–5.2)
ALBUMIN/GLOB SERPL: 1.6 G/DL
ALP SERPL-CCNC: 54 U/L (ref 39–117)
ALT SERPL W P-5'-P-CCNC: 30 U/L (ref 1–41)
ANION GAP SERPL CALCULATED.3IONS-SCNC: 11 MMOL/L (ref 5–15)
APTT PPP: 23.4 SECONDS (ref 24–31)
AST SERPL-CCNC: 38 U/L (ref 1–40)
BASOPHILS # BLD AUTO: 0 10*3/MM3 (ref 0–0.2)
BASOPHILS NFR BLD AUTO: 0 % (ref 0–1.5)
BILIRUB SERPL-MCNC: 1 MG/DL (ref 0–1.2)
BILIRUB UR QL STRIP: NEGATIVE
BUN SERPL-MCNC: 29 MG/DL (ref 8–23)
BUN/CREAT SERPL: 30.9 (ref 7–25)
CALCIUM SPEC-SCNC: 9.7 MG/DL (ref 8.6–10.5)
CHLORIDE SERPL-SCNC: 102 MMOL/L (ref 98–107)
CLARITY UR: CLEAR
CO2 SERPL-SCNC: 25 MMOL/L (ref 22–29)
COLOR UR: YELLOW
CREAT SERPL-MCNC: 0.94 MG/DL (ref 0.76–1.27)
DEPRECATED RDW RBC AUTO: 45.9 FL (ref 37–54)
EGFRCR SERPLBLD CKD-EPI 2021: 80.9 ML/MIN/1.73
EOSINOPHIL # BLD AUTO: 0 10*3/MM3 (ref 0–0.4)
EOSINOPHIL NFR BLD AUTO: 0 % (ref 0.3–6.2)
ERYTHROCYTE [DISTWIDTH] IN BLOOD BY AUTOMATED COUNT: 13 % (ref 12.3–15.4)
GLOBULIN UR ELPH-MCNC: 2.5 GM/DL
GLUCOSE SERPL-MCNC: 165 MG/DL (ref 65–99)
GLUCOSE UR STRIP-MCNC: NEGATIVE MG/DL
HCT VFR BLD AUTO: 39.5 % (ref 37.5–51)
HGB BLD-MCNC: 13.4 G/DL (ref 13–17.7)
HGB UR QL STRIP.AUTO: NEGATIVE
INR PPP: 1.02 (ref 0.93–1.1)
KETONES UR QL STRIP: NEGATIVE
LEUKOCYTE ESTERASE UR QL STRIP.AUTO: NEGATIVE
LYMPHOCYTES # BLD AUTO: 0.4 10*3/MM3 (ref 0.7–3.1)
LYMPHOCYTES NFR BLD AUTO: 2.8 % (ref 19.6–45.3)
MCH RBC QN AUTO: 32.8 PG (ref 26.6–33)
MCHC RBC AUTO-ENTMCNC: 33.8 G/DL (ref 31.5–35.7)
MCV RBC AUTO: 97 FL (ref 79–97)
MONOCYTES # BLD AUTO: 0.9 10*3/MM3 (ref 0.1–0.9)
MONOCYTES NFR BLD AUTO: 6.1 % (ref 5–12)
NEUTROPHILS NFR BLD AUTO: 13.7 10*3/MM3 (ref 1.7–7)
NEUTROPHILS NFR BLD AUTO: 91.1 % (ref 42.7–76)
NITRITE UR QL STRIP: NEGATIVE
NRBC BLD AUTO-RTO: 0 /100 WBC (ref 0–0.2)
PH UR STRIP.AUTO: 6 [PH] (ref 5–8)
PLATELET # BLD AUTO: 219 10*3/MM3 (ref 140–450)
PMV BLD AUTO: 7.6 FL (ref 6–12)
POTASSIUM SERPL-SCNC: 4.6 MMOL/L (ref 3.5–5.2)
PROT SERPL-MCNC: 6.4 G/DL (ref 6–8.5)
PROT UR QL STRIP: NEGATIVE
PROTHROMBIN TIME: 11.1 SECONDS (ref 9.6–11.7)
RBC # BLD AUTO: 4.07 10*6/MM3 (ref 4.14–5.8)
SODIUM SERPL-SCNC: 138 MMOL/L (ref 136–145)
SP GR UR STRIP: 1.03 (ref 1–1.03)
UROBILINOGEN UR QL STRIP: NORMAL
WBC NRBC COR # BLD: 15.1 10*3/MM3 (ref 3.4–10.8)

## 2023-11-09 PROCEDURE — G0378 HOSPITAL OBSERVATION PER HR: HCPCS

## 2023-11-09 PROCEDURE — 85730 THROMBOPLASTIN TIME PARTIAL: CPT | Performed by: INTERNAL MEDICINE

## 2023-11-09 PROCEDURE — G0379 DIRECT REFER HOSPITAL OBSERV: HCPCS

## 2023-11-09 PROCEDURE — 70450 CT HEAD/BRAIN W/O DYE: CPT

## 2023-11-09 PROCEDURE — 25010000002 HYDRALAZINE PER 20 MG: Performed by: INTERNAL MEDICINE

## 2023-11-09 PROCEDURE — 85025 COMPLETE CBC W/AUTO DIFF WBC: CPT | Performed by: INTERNAL MEDICINE

## 2023-11-09 PROCEDURE — 80053 COMPREHEN METABOLIC PANEL: CPT | Performed by: INTERNAL MEDICINE

## 2023-11-09 PROCEDURE — 72192 CT PELVIS W/O DYE: CPT

## 2023-11-09 PROCEDURE — 96374 THER/PROPH/DIAG INJ IV PUSH: CPT

## 2023-11-09 PROCEDURE — 85610 PROTHROMBIN TIME: CPT | Performed by: INTERNAL MEDICINE

## 2023-11-09 PROCEDURE — 81003 URINALYSIS AUTO W/O SCOPE: CPT | Performed by: INTERNAL MEDICINE

## 2023-11-09 RX ORDER — OXYCODONE HYDROCHLORIDE 5 MG/1
5 TABLET ORAL EVERY 4 HOURS PRN
Status: DISCONTINUED | OUTPATIENT
Start: 2023-11-09 | End: 2023-11-11 | Stop reason: HOSPADM

## 2023-11-09 RX ORDER — SODIUM CHLORIDE 0.9 % (FLUSH) 0.9 %
10 SYRINGE (ML) INJECTION EVERY 12 HOURS SCHEDULED
Status: DISCONTINUED | OUTPATIENT
Start: 2023-11-09 | End: 2023-11-11 | Stop reason: HOSPADM

## 2023-11-09 RX ORDER — BISACODYL 5 MG/1
5 TABLET, DELAYED RELEASE ORAL DAILY PRN
Status: DISCONTINUED | OUTPATIENT
Start: 2023-11-09 | End: 2023-11-11 | Stop reason: HOSPADM

## 2023-11-09 RX ORDER — PANTOPRAZOLE SODIUM 40 MG/1
40 TABLET, DELAYED RELEASE ORAL
Status: DISCONTINUED | OUTPATIENT
Start: 2023-11-10 | End: 2023-11-11 | Stop reason: HOSPADM

## 2023-11-09 RX ORDER — MELATONIN
1000 DAILY
Status: DISCONTINUED | OUTPATIENT
Start: 2023-11-09 | End: 2023-11-11 | Stop reason: HOSPADM

## 2023-11-09 RX ORDER — SODIUM CHLORIDE 9 MG/ML
40 INJECTION, SOLUTION INTRAVENOUS AS NEEDED
Status: DISCONTINUED | OUTPATIENT
Start: 2023-11-09 | End: 2023-11-11 | Stop reason: HOSPADM

## 2023-11-09 RX ORDER — SODIUM CHLORIDE 0.9 % (FLUSH) 0.9 %
10 SYRINGE (ML) INJECTION AS NEEDED
Status: DISCONTINUED | OUTPATIENT
Start: 2023-11-09 | End: 2023-11-11 | Stop reason: HOSPADM

## 2023-11-09 RX ORDER — PREDNISOLONE ACETATE 10 MG/ML
1 SUSPENSION/ DROPS OPHTHALMIC DAILY
Status: DISCONTINUED | OUTPATIENT
Start: 2023-11-09 | End: 2023-11-11 | Stop reason: HOSPADM

## 2023-11-09 RX ORDER — LOSARTAN POTASSIUM 50 MG/1
100 TABLET ORAL
Status: DISCONTINUED | OUTPATIENT
Start: 2023-11-09 | End: 2023-11-11 | Stop reason: HOSPADM

## 2023-11-09 RX ORDER — TELMISARTAN AND HYDROCHLORTHIAZIDE 80; 12.5 MG/1; MG/1
1 TABLET ORAL DAILY
Status: ON HOLD | COMMUNITY
End: 2023-11-11 | Stop reason: SDUPTHER

## 2023-11-09 RX ORDER — CHOLECALCIFEROL (VITAMIN D3) 125 MCG
10 CAPSULE ORAL NIGHTLY
Status: DISCONTINUED | OUTPATIENT
Start: 2023-11-09 | End: 2023-11-11 | Stop reason: HOSPADM

## 2023-11-09 RX ORDER — LUBIPROSTONE 24 UG/1
24 CAPSULE ORAL 2 TIMES DAILY WITH MEALS
Status: DISCONTINUED | OUTPATIENT
Start: 2023-11-09 | End: 2023-11-11 | Stop reason: HOSPADM

## 2023-11-09 RX ORDER — AMOXICILLIN 250 MG
2 CAPSULE ORAL 2 TIMES DAILY
Status: DISCONTINUED | OUTPATIENT
Start: 2023-11-09 | End: 2023-11-11 | Stop reason: HOSPADM

## 2023-11-09 RX ORDER — HYDRALAZINE HYDROCHLORIDE 20 MG/ML
20 INJECTION INTRAMUSCULAR; INTRAVENOUS EVERY 4 HOURS PRN
Status: DISCONTINUED | OUTPATIENT
Start: 2023-11-09 | End: 2023-11-11 | Stop reason: HOSPADM

## 2023-11-09 RX ORDER — ACETAMINOPHEN 325 MG/1
650 TABLET ORAL EVERY 4 HOURS PRN
Status: DISCONTINUED | OUTPATIENT
Start: 2023-11-09 | End: 2023-11-10

## 2023-11-09 RX ORDER — POLYETHYLENE GLYCOL 3350 17 G/17G
17 POWDER, FOR SOLUTION ORAL DAILY PRN
Status: DISCONTINUED | OUTPATIENT
Start: 2023-11-09 | End: 2023-11-11 | Stop reason: HOSPADM

## 2023-11-09 RX ORDER — BISACODYL 10 MG
10 SUPPOSITORY, RECTAL RECTAL DAILY PRN
Status: DISCONTINUED | OUTPATIENT
Start: 2023-11-09 | End: 2023-11-11 | Stop reason: HOSPADM

## 2023-11-09 RX ORDER — PANTOPRAZOLE SODIUM 40 MG/1
40 TABLET, DELAYED RELEASE ORAL ONCE
Status: COMPLETED | OUTPATIENT
Start: 2023-11-09 | End: 2023-11-09

## 2023-11-09 RX ADMIN — PANTOPRAZOLE SODIUM 40 MG: 40 TABLET, DELAYED RELEASE ORAL at 21:48

## 2023-11-09 RX ADMIN — LOSARTAN POTASSIUM 100 MG: 50 TABLET, FILM COATED ORAL at 16:34

## 2023-11-09 RX ADMIN — Medication 10 MG: at 21:47

## 2023-11-09 RX ADMIN — HYDRALAZINE HYDROCHLORIDE 20 MG: 20 INJECTION, SOLUTION INTRAMUSCULAR; INTRAVENOUS at 16:34

## 2023-11-09 RX ADMIN — Medication 10 ML: at 20:16

## 2023-11-09 RX ADMIN — ACETAMINOPHEN 650 MG: 325 TABLET, FILM COATED ORAL at 18:22

## 2023-11-09 NOTE — CASE MANAGEMENT/SOCIAL WORK
Discharge Planning Assessment  [facility]     Patient Name: Anthony Lugo  MRN: [id number]  Today's Date: 11/9/2023    Admit Date: 11/9/2023    Plan: DC PLAN: Return home with wife.     Discharge Needs Assessment       Row Name 11/09/23 1432       Living Environment    People in Home spouse    Current Living Arrangements home    Potentially Unsafe Housing Conditions none    In the past 12 months has the electric, gas, oil, or water company threatened to shut off services in your home? No    Primary Care Provided by self    Able to Return to Prior Arrangements yes       Resource/Environmental Concerns    Resource/Environmental Concerns none       Food Insecurity    Within the past 12 months, you worried that your food would run out before you got the money to buy more. Never true    Within the past 12 months, the food you bought just didn't last and you didn't have money to get more. Never true       Transition Planning    Patient/Family Anticipates Transition to home with family    Patient/Family Anticipated Services at Transition none    Transportation Anticipated family or friend will provide;car, drives self       Discharge Needs Assessment    Equipment Currently Used at Home none                   Discharge Plan       Row Name 11/09/23 1434       Plan    Plan DC PLAN: Return home with wife.    Patient/Family in Agreement with Plan yes    Plan Comments Met with patient and wife at bedside, from routine home with wife. Independent with ADL's no DME. PCP is [facility]. Pharmacy [facility]. Able to afford medications and denies any transportation issues, still drives or wife will provide.                  Continued Care and Services - Admitted Since 11/9/2023    Coordination has not been started for this encounter.          Demographic Summary       Row Name 11/09/23 1431       General Information    Admission Type observation    Referral Source admission list    Reason for Consult discharge planning    Preferred  Language English       Contact Information    Permission Granted to Share Info With     Contact Information Obtained for                    Functional Status       Row Name 11/09/23 1431       Functional Status    Usual Activity Tolerance moderate    Current Activity Tolerance moderate       Functional Status, IADL    Medications independent    Meal Preparation independent    Housekeeping independent    Laundry independent    Shopping independent       Mental Status    General Appearance WDL WDL                    Annette Dickinson RN

## 2023-11-09 NOTE — CONSULTS
[facility]   Consult Note    Patient Name: Anthony Lugo  : 1940  MRN: [id number]  Primary Care Physician:  Andres Leong MD  Referring Physician: Andres Leong MD  Date of admission: 2023    Subjective   Subjective     Reason for Consult/ Chief Complaint: Fall with head trauma    HPI:  Anthony Lugo is a 83 y.o. male that was direct admit from PCPs office.  Patient suffered a fall today off of an elevated surface landing backwards striking the back of his head.  There is no loss of consciousness but loss of consciousness.  His PCP ordered imaging which demonstrated small amount of hyperintensities in the bifrontal region suggestive of subarachnoid hemorrhage.  Orthopedics also consulted for reported pelvic fracture.  Neurosurgery was consulted for further evaluation and offer recommendations.    Upon my evaluation, patient is awake and alert and oriented lying semirecumbent in bed.  Spouse is at bedside.  Patient does report being on elevated surface repositioning boxes and lost his footing falling backward.  The box he had in his hands subsequently fell back onto the right side of his face hitting his right periorbital region causing significant ecchymosis.   He also sustained laceration to the back of his head.  Patient reports no current platelet or anticoagulation usage.  Patient reports no headache, dizziness, nausea/vomiting, visual disturbance, speech issues, focal sensorimotor deficits.  He does have obvious pain along his right pelvic region.        Review of Systems       Personal History     Past Medical History:   Diagnosis Date    Hypertension     Mumps        Past Surgical History:   Procedure Laterality Date    COLONOSCOPY  2013    PROSTATECTOMY  2001       Family History: family history includes Asthma in an other family member. Otherwise pertinent FHx was reviewed and not pertinent to current issue.    Social History:  reports that he has never smoked. He  has never used smokeless tobacco. He reports current alcohol use. He reports that he does not use drugs.    Home Medications:  Co-Enzyme Q-10, Fish Oil, Healthy Heart Complex, Vitamin D-3, aspirin, azelastine, calcium carbonate, cefdinir, multivitamin with minerals, prednisoLONE acetate, tacrolimus, and telmisartan-hydrochlorothiazide    Allergies:  No Known Allergies    Objective    Objective     Vitals:   Temp:  [97.6 °F (36.4 °C)] 97.6 °F (36.4 °C)  Heart Rate:  [61-68] 61  Resp:  [19-22] 19  BP: (190-206)/(84-91) 190/84    Physical Exam:   Constitutional: Awake, alert   Eyes: PERRLA,    HENT: NCAT, mucous membranes moist   Neck: Supple, no thyromegaly, no lymphadenopathy, trachea midline   Respiratory: Clear to auscultation bilaterally, nonlabored respirations    Cardiovascular: RRR, no murmurs, rubs, or gallops, palpable pedal pulses bilaterally   Gastrointestinal: Positive bowel sounds, soft, nontender, nondistended   Musculoskeletal: No bilateral ankle edema, no clubbing or cyanosis to extremities   Psychiatric: Appropriate affect, cooperative   Neurologic: Oriented x 3, strength symmetric in all extremities, Cranial Nerves grossly intact to confrontation, speech clear   Skin: Right periorbital ecchymosis, occipital laceration with sutures    Result Review    Result Review:  I have personally reviewed the results from the time of this admission to 11/9/2023 15:05 EST and agree with these findings:  []  Laboratory list / accordion  []  Microbiology  [x]  Radiology  []  EKG/Telemetry   []  Cardiology/Vascular   []  Pathology  []  Old records  []  Other:  Most notable findings include: Small areas of presumedly traumatic subarachnoid hemorrhage along the bifrontal lobes right over left.  No significant mass effect, no midline shift, ventricles appear symmetric and appropriate.    Assessment & Plan   Assessment / Plan     Brief Patient Summary:  Anthony Lugo is a 83 y.o. male presenting with small amount of  traumatic subarachnoid hemorrhages along his bifrontal lobes.  Patient is neurologically intact.  Neurosurgery recommends medical management with strict blood pressure control and scheduled neurochecks.  He will also need CT of the head follow-up in 6 hours to establish stability.    Active Hospital Problems:  Active Hospital Problems    Diagnosis     **Subarachnoid hemorrhage after traumatic injury without open intracranial wound, with prolonged loss of consciousness and return to pre-existing level of consciousness      Plan:     -Systolic blood pressure less than 150  -Recommend admission to neuro floor  - Every 1 hour neurochecks  - Repeat CT head image 1800  - Please call for any questions or concerns    Pelvic fracture    - Management per orthopedics  - Should patient need surgical fixation, recommendations to hold any DVT prophylaxis    Discussed findings with patient, spouse, nursing staff and Dr. Brink.    Malathi Alarcon, APRN

## 2023-11-09 NOTE — H&P
History and Physical      Name: Anthony Lugo ADMIT: 2023   : 1940  PROVIDER: Andres Leong MD    MRN: [id number] LOS: 1 days   AGE/SEX: 83 y.o. male  ROOM: [facility]/     Date of Service: 2023                        Chief Complaint:       Fell at work with blunt trauma to back of head and pelvis.  CT positive for subarachnoid hemorrhage and bilateral pelvic fracture.    History of Present Illness:       Amador Lugo is a very pleasant and generally functionally active 83-year-old WM well-known to me with HPTN, remote prostate CA, HLD, and moderate bilateral sensorineural hearing loss who presented to my office today after falling backwards off of a 40 inch table onto concrete producing occipital laceration and severe right hip pain.  In the office, he was found to have right periorbital ecchymoses, and approximately 3.5 cm irregular occipital scalp laceration/abrasion and severe tenderness over his right iliac crest and pelvis with soft tissue ecchymoses and a very antalgic gait.  After cleaning up the wounds his scalp laceration was sutured and he was sent directly to CT scan for stat head and pelvic CT imaging.  CT images revealed a small subarachnoid hemorrhage with bilateral pelvic fractures, nondisplaced.  He was subsequently made direct admission for further evaluation and treatment of acute head and pelvic trauma.    Review of Systems:         Limited ROS history available due to acute pain pain from his earlier fall.  He denied headache visual changes or any change in his hearing or swallowing.  He denies chest pain or palpitations and has no difficulty with shortness of air.  No abdominal pain.  Denies incontinence.  No specific musculoskeletal deformity or loss of sensorimotor function despite very antalgic gait with limited weightbearing.    Past medical history, surgical history, social history, family history, allergies and all medications reviewed and agreed.      Past  History/Allergies?Social History:     Past Medical History:   Diagnosis Date    Hypertension     Mumps          Past Surgical History :     Past Surgical History:   Procedure Laterality Date    COLONOSCOPY  2013    PROSTATECTOMY  2001          Family History:     Family History   Problem Relation Age of Onset    Asthma Other           Social History:     Social History     Tobacco Use    Smoking status: Never    Smokeless tobacco: Never   Substance Use Topics    Alcohol use: Yes     Comment: Social          Allergies:     No Known Allergies      Home meds:     Medications Prior to Admission   Medication Sig Dispense Refill Last Dose    aspirin 81 MG EC tablet Take 1 tablet by mouth Daily.   11/9/2023    azelastine (ASTELIN) 0.1 % nasal spray 1 spray into the nostril(s) as directed by provider 2 (Two) Times a Day.   11/9/2023    calcium carbonate (OS-CHRISTIAN) 600 MG tablet Take 1 tablet by mouth Daily.   11/9/2023    Cholecalciferol (VITAMIN D-3) 1000 units capsule 1,000 Units Daily.   11/9/2023    Co-Enzyme Q-10 100 MG capsule Take 1 capsule by mouth Daily.   11/9/2023    Multiple Vitamins-Minerals (CENTRUM SILVER ADULT 50+) tablet CENTRUM SILVER ADULT 50+ TABS   11/9/2023    Omega-3 Fatty Acids (FISH OIL) 1000 MG capsule delayed-release FISH OIL 1000 MG CPDR   11/9/2023    Specialty Vitamins Products (HEALTHY HEART COMPLEX) tablet HEALTHY HEART COMPLEX TABS   11/9/2023    telmisartan-hydrochlorothiazide (MICARDIS HCT) 80-12.5 MG per tablet Take 1 tablet by mouth Daily.  2 11/9/2023    cefdinir (OMNICEF) 300 MG capsule Take 1 capsule by mouth 2 (Two) Times a Day. 20 capsule 0     prednisoLONE acetate (PRED FORTE) 1 % ophthalmic suspension Apply 1 % to eye(s) as directed by provider Daily.  0     tacrolimus (PROTOPIC) 0.1 % ointment Apply  topically to the appropriate area as directed.            Scheduled meds:   losartan, 100 mg, Oral, Q24H  lubiprostone, 24 mcg, Oral, BID With Meals  senna-docusate sodium, 2 tablet,  "Oral, BID  sodium chloride, 10 mL, Intravenous, Q12H          Objectives:     tMax 24 hrs: Temp (24hrs), Av.6 °F (36.4 °C), Min:97.6 °F (36.4 °C), Max:97.6 °F (36.4 °C)      Vitals Ranges:   Temp:  [97.6 °F (36.4 °C)] 97.6 °F (36.4 °C)  Heart Rate:  [61-68] 61  Resp:  [19-22] 19  BP: (190-206)/(84-91) 190/84    Intake and Output Last 3 Shifts:   No intake/output data recorded.      Exam:     BP (!) 190/84   Pulse 61   Temp 97.6 °F (36.4 °C) (Oral)   Resp 19   Ht 170.2 cm (67\")   Wt 65.8 kg (145 lb)   SpO2 97%   BMI 22.71 kg/m²     HEENT: Irregular triquetral scalp fracture/laceration of the occipital region approximately 3.5 cm-now sutured.  Right periorbital ecchymoses.  PERRL; EOMI, sclera clear; nostrils and oropharynx clear with dentition intact; tongue midline.  Neck: Supple without adenopathy; no JVD or bruits  Lungs: CTA with adequate global airflow and no specific adventitious sounds  Heart: RRR without significant murmur  Abd: Soft, NT, ND, BS positive, no hepatosplenomegaly  Ext: Decreased range of motion in both lower extremities with very antalgic gait extreme tenderness of the pelvis and iliac crest bilaterally  Neuro: CN grossly intact, no focal deficits  Skin: Periorbital ecchymoses with minor abrasion and significant occipital scalp laceration described above        Data Review:       Lab Results (last 24 hours)       Procedure Component Value Units Date/Time    Comprehensive Metabolic Panel [[id number]]  (Abnormal) Collected: 23 1438    Specimen: Blood Updated: 23 1517     Glucose 165 mg/dL      BUN 29 mg/dL      Creatinine 0.94 mg/dL      Sodium 138 mmol/L      Potassium 4.6 mmol/L      Chloride 102 mmol/L      CO2 25.0 mmol/L      Calcium 9.7 mg/dL      Total Protein 6.4 g/dL      Albumin 3.9 g/dL      ALT (SGPT) 30 U/L      AST (SGOT) 38 U/L      Alkaline Phosphatase 54 U/L      Total Bilirubin 1.0 mg/dL      Globulin 2.5 gm/dL      A/G Ratio 1.6 g/dL      BUN/Creatinine " Ratio 30.9     Anion Gap 11.0 mmol/L      eGFR 80.9 mL/min/1.73     Narrative:      GFR Normal >60  Chronic Kidney Disease <60  Kidney Failure <15    The GFR formula is only valid for adults with stable renal function between ages 18 and 70.    aPTT [[id number]]  (Abnormal) Collected: 11/09/23 1438    Specimen: Blood from Arm, Right Updated: 11/09/23 1513     PTT 23.4 seconds     Protime-INR [[id number]]  (Normal) Collected: 11/09/23 1438    Specimen: Blood from Arm, Right Updated: 11/09/23 1513     Protime 11.1 Seconds      INR 1.02    CBC Auto Differential [[id number]]  (Abnormal) Collected: 11/09/23 1438    Specimen: Blood Updated: 11/09/23 1502     WBC 15.10 10*3/mm3      RBC 4.07 10*6/mm3      Hemoglobin 13.4 g/dL      Hematocrit 39.5 %      MCV 97.0 fL      MCH 32.8 pg      MCHC 33.8 g/dL      RDW 13.0 %      RDW-SD 45.9 fl      MPV 7.6 fL      Platelets 219 10*3/mm3      Neutrophil % 91.1 %      Lymphocyte % 2.8 %      Monocyte % 6.1 %      Eosinophil % 0.0 %      Basophil % 0.0 %      Neutrophils, Absolute 13.70 10*3/mm3      Lymphocytes, Absolute 0.40 10*3/mm3      Monocytes, Absolute 0.90 10*3/mm3      Eosinophils, Absolute 0.00 10*3/mm3      Basophils, Absolute 0.00 10*3/mm3      nRBC 0.0 /100 WBC                   Imaging:      Imaging Results (Last 24 Hours)       ** No results found for the last 24 hours. **          No orders to display   C      Assessment:      Principal Problem:    Subarachnoid hemorrhage after traumatic injury without open intracranial wound, with prolonged loss of consciousness and return to pre-existing level of consciousness      1.  83-year-old WM with subarachnoid hemorrhage and scalp laceration secondary to fall with blunt trauma    2.  Nondisplaced bilateral pelvic fracture-secondary to fall    3.  Essential hypertension-mildly accelerated on admission; generally well regulated on telmisartan with HCTZ    4 mild hyperlipidemia-not on statin therapy    5.  Moderately severe  bilateral sensorineural hearing loss with bilateral hearing aids    6.  History of osteopenia-maintained on calcium and vitamin D supplements    Plan:     Admit to general medical [facility] for close observation with neurochecks every 2 hours and follow-up head CT in approximately 6 hours   Vigilant blood pressure regulation; maintain systolic blood pressure below 150 mmHg  Neurosurgery consult  Orthopedic consult  Analgesia, DVT prophylaxis, GI prophylaxis and additional supportive care as indicated     Clinical condition and further diagnostic and therapeutic plans discussed with patient, wife at bedside as well as bedside nurse.  Further recommendations pending clinical course        Andres Leong MD  [facility], [facility]  11/9/2023  16:06 EST

## 2023-11-09 NOTE — PLAN OF CARE
Goal Outcome Evaluation:               Direct admit to hospital from Dr Leong office. Post fall. SAH without midline shift and pelivc fracture. Neuro surgery and orthopedic surgery consulted.

## 2023-11-10 ENCOUNTER — APPOINTMENT (OUTPATIENT)
Dept: GENERAL RADIOLOGY | Facility: HOSPITAL | Age: 83
End: 2023-11-10
Payer: MEDICARE

## 2023-11-10 LAB
ANION GAP SERPL CALCULATED.3IONS-SCNC: 11 MMOL/L (ref 5–15)
BUN SERPL-MCNC: 22 MG/DL (ref 8–23)
BUN/CREAT SERPL: 24.4 (ref 7–25)
CALCIUM SPEC-SCNC: 9 MG/DL (ref 8.6–10.5)
CHLORIDE SERPL-SCNC: 102 MMOL/L (ref 98–107)
CO2 SERPL-SCNC: 25 MMOL/L (ref 22–29)
CREAT SERPL-MCNC: 0.9 MG/DL (ref 0.76–1.27)
DEPRECATED RDW RBC AUTO: 46.8 FL (ref 37–54)
EGFRCR SERPLBLD CKD-EPI 2021: 85.3 ML/MIN/1.73
ERYTHROCYTE [DISTWIDTH] IN BLOOD BY AUTOMATED COUNT: 13.2 % (ref 12.3–15.4)
GLUCOSE SERPL-MCNC: 129 MG/DL (ref 65–99)
HCT VFR BLD AUTO: 36.1 % (ref 37.5–51)
HGB BLD-MCNC: 12.3 G/DL (ref 13–17.7)
MCH RBC QN AUTO: 32.9 PG (ref 26.6–33)
MCHC RBC AUTO-ENTMCNC: 34.1 G/DL (ref 31.5–35.7)
MCV RBC AUTO: 96.7 FL (ref 79–97)
PLATELET # BLD AUTO: 203 10*3/MM3 (ref 140–450)
PMV BLD AUTO: 7.8 FL (ref 6–12)
POTASSIUM SERPL-SCNC: 3.6 MMOL/L (ref 3.5–5.2)
RBC # BLD AUTO: 3.73 10*6/MM3 (ref 4.14–5.8)
SODIUM SERPL-SCNC: 138 MMOL/L (ref 136–145)
WBC NRBC COR # BLD: 8.2 10*3/MM3 (ref 3.4–10.8)

## 2023-11-10 PROCEDURE — G0378 HOSPITAL OBSERVATION PER HR: HCPCS

## 2023-11-10 PROCEDURE — 97166 OT EVAL MOD COMPLEX 45 MIN: CPT

## 2023-11-10 PROCEDURE — 80048 BASIC METABOLIC PNL TOTAL CA: CPT | Performed by: INTERNAL MEDICINE

## 2023-11-10 PROCEDURE — 97535 SELF CARE MNGMENT TRAINING: CPT

## 2023-11-10 PROCEDURE — 71046 X-RAY EXAM CHEST 2 VIEWS: CPT

## 2023-11-10 PROCEDURE — 97162 PT EVAL MOD COMPLEX 30 MIN: CPT

## 2023-11-10 PROCEDURE — 97530 THERAPEUTIC ACTIVITIES: CPT

## 2023-11-10 PROCEDURE — 85027 COMPLETE CBC AUTOMATED: CPT | Performed by: INTERNAL MEDICINE

## 2023-11-10 RX ORDER — CELECOXIB 200 MG/1
200 CAPSULE ORAL EVERY 12 HOURS SCHEDULED
Status: DISCONTINUED | OUTPATIENT
Start: 2023-11-10 | End: 2023-11-11 | Stop reason: HOSPADM

## 2023-11-10 RX ORDER — ACETAMINOPHEN 500 MG
1000 TABLET ORAL EVERY 8 HOURS PRN
Status: DISCONTINUED | OUTPATIENT
Start: 2023-11-10 | End: 2023-11-11 | Stop reason: HOSPADM

## 2023-11-10 RX ORDER — HYDRALAZINE HYDROCHLORIDE 25 MG/1
25 TABLET, FILM COATED ORAL EVERY 12 HOURS SCHEDULED
Status: DISCONTINUED | OUTPATIENT
Start: 2023-11-10 | End: 2023-11-11 | Stop reason: HOSPADM

## 2023-11-10 RX ADMIN — PANTOPRAZOLE SODIUM 40 MG: 40 TABLET, DELAYED RELEASE ORAL at 08:55

## 2023-11-10 RX ADMIN — HYDRALAZINE HYDROCHLORIDE 25 MG: 25 TABLET, FILM COATED ORAL at 20:24

## 2023-11-10 RX ADMIN — LUBIPROSTONE 24 MCG: 24 CAPSULE, GELATIN COATED ORAL at 20:24

## 2023-11-10 RX ADMIN — Medication 10 ML: at 20:26

## 2023-11-10 RX ADMIN — DOCUSATE SODIUM 50MG AND SENNOSIDES 8.6MG 2 TABLET: 8.6; 5 TABLET, FILM COATED ORAL at 08:54

## 2023-11-10 RX ADMIN — CELECOXIB 200 MG: 200 CAPSULE ORAL at 17:18

## 2023-11-10 RX ADMIN — DOCUSATE SODIUM 50MG AND SENNOSIDES 8.6MG 2 TABLET: 8.6; 5 TABLET, FILM COATED ORAL at 20:26

## 2023-11-10 RX ADMIN — Medication 10 ML: at 08:57

## 2023-11-10 RX ADMIN — Medication 1000 UNITS: at 08:55

## 2023-11-10 RX ADMIN — Medication 10 MG: at 20:24

## 2023-11-10 RX ADMIN — ACETAMINOPHEN 1000 MG: 500 TABLET, FILM COATED ORAL at 20:24

## 2023-11-10 RX ADMIN — LOSARTAN POTASSIUM 100 MG: 50 TABLET, FILM COATED ORAL at 08:55

## 2023-11-10 NOTE — PLAN OF CARE
Problem: Adult Inpatient Plan of Care  Goal: Plan of Care Review  Outcome: Ongoing, Progressing  Flowsheets (Taken 11/10/2023 0439)  Progress: improving  Plan of Care Reviewed With:   patient   spouse  Goal: Patient-Specific Goal (Individualized)  Outcome: Ongoing, Progressing  Goal: Absence of Hospital-Acquired Illness or Injury  Outcome: Ongoing, Progressing  Intervention: Identify and Manage Fall Risk  Recent Flowsheet Documentation  Taken 11/10/2023 0400 by Velma Reeder RN  Safety Promotion/Fall Prevention:   safety round/check completed   room organization consistent   fall prevention program maintained   clutter free environment maintained   assistive device/personal items within reach  Taken 11/10/2023 0200 by Velma Reeder RN  Safety Promotion/Fall Prevention:   safety round/check completed   room organization consistent   fall prevention program maintained   clutter free environment maintained   assistive device/personal items within reach  Taken 11/10/2023 0000 by Velma Reeder RN  Safety Promotion/Fall Prevention:   safety round/check completed   room organization consistent   clutter free environment maintained   assistive device/personal items within reach   fall prevention program maintained  Taken 11/9/2023 2200 by Velma Reeder RN  Safety Promotion/Fall Prevention:   safety round/check completed   room organization consistent   fall prevention program maintained   clutter free environment maintained   assistive device/personal items within reach  Taken 11/9/2023 2000 by Velma Reeder RN  Safety Promotion/Fall Prevention:   safety round/check completed   room organization consistent   clutter free environment maintained   assistive device/personal items within reach   fall prevention program maintained   nonskid shoes/slippers when out of bed  Intervention: Prevent Skin Injury  Recent Flowsheet Documentation  Taken 11/10/2023 0400 by Velma Reeder RN  Body  Position: weight shifting  Taken 11/10/2023 0200 by Velma Reeder RN  Body Position: weight shifting  Taken 11/10/2023 0000 by Velma Reeder RN  Body Position: weight shifting  Taken 11/9/2023 2200 by Velma Reeder RN  Body Position: weight shifting  Taken 11/9/2023 2000 by Velma Reeder RN  Body Position: weight shifting  Intervention: Prevent and Manage VTE (Venous Thromboembolism) Risk  Recent Flowsheet Documentation  Taken 11/10/2023 0400 by Velma Reeder RN  Activity Management: bedrest  Range of Motion: ROM (range of motion) performed  Taken 11/10/2023 0200 by Velma Reeder RN  Activity Management: bedrest  Taken 11/10/2023 0000 by Velma Reeder RN  Activity Management: bedrest  Taken 11/9/2023 2200 by Velma Reeder RN  Activity Management: bedrest  Taken 11/9/2023 2000 by Velma Reeder RN  Activity Management: bedrest  Intervention: Prevent Infection  Recent Flowsheet Documentation  Taken 11/10/2023 0400 by Velma Reeder RN  Infection Prevention:   single patient room provided   rest/sleep promoted   personal protective equipment utilized   hand hygiene promoted   environmental surveillance performed  Taken 11/10/2023 0200 by Velma Reeder RN  Infection Prevention:   single patient room provided   rest/sleep promoted   hand hygiene promoted   personal protective equipment utilized   environmental surveillance performed  Taken 11/10/2023 0000 by Velma Reeder RN  Infection Prevention:   single patient room provided   rest/sleep promoted   personal protective equipment utilized   hand hygiene promoted   environmental surveillance performed  Taken 11/9/2023 2200 by Velma Reeder RN  Infection Prevention:   single patient room provided   rest/sleep promoted   personal protective equipment utilized   hand hygiene promoted   environmental surveillance performed  Taken 11/9/2023 2000 by Velma Reeder RN  Infection Prevention:    single patient room provided   rest/sleep promoted   personal protective equipment utilized   hand hygiene promoted   environmental surveillance performed  Goal: Optimal Comfort and Wellbeing  Outcome: Ongoing, Progressing  Intervention: Provide Person-Centered Care  Recent Flowsheet Documentation  Taken 11/10/2023 0400 by Velma Reeder RN  Trust Relationship/Rapport: care explained  Taken 11/10/2023 0000 by Velma Reeder RN  Trust Relationship/Rapport: care explained  Taken 11/9/2023 2000 by Velma Reeder RN  Trust Relationship/Rapport:   care explained   choices provided   emotional support provided   empathic listening provided   questions encouraged   questions answered   reassurance provided  Goal: Readiness for Transition of Care  Outcome: Ongoing, Progressing     Problem: Asthma Comorbidity  Goal: Maintenance of Asthma Control  Outcome: Ongoing, Progressing  Intervention: Maintain Asthma Symptom Control  Recent Flowsheet Documentation  Taken 11/10/2023 0400 by Velma Reeder RN  Medication Review/Management:   medications reviewed   high-risk medications identified  Taken 11/10/2023 0200 by Velma Reeder RN  Medication Review/Management:   medications reviewed   high-risk medications identified  Taken 11/10/2023 0000 by Velma Reeder RN  Medication Review/Management:   medications reviewed   high-risk medications identified  Taken 11/9/2023 2200 by Velma Reeder RN  Medication Review/Management:   medications reviewed   high-risk medications identified  Taken 11/9/2023 2000 by Velma Reeder RN  Medication Review/Management:   medications reviewed   high-risk medications identified     Problem: Behavioral Health Comorbidity  Goal: Maintenance of Behavioral Health Symptom Control  Outcome: Ongoing, Progressing  Intervention: Maintain Behavioral Health Symptom Control  Recent Flowsheet Documentation  Taken 11/10/2023 0400 by Velma Reeder RN  Medication  Review/Management:   medications reviewed   high-risk medications identified  Taken 11/10/2023 0200 by Velma Reeder RN  Medication Review/Management:   medications reviewed   high-risk medications identified  Taken 11/10/2023 0000 by Velma Reeder RN  Medication Review/Management:   medications reviewed   high-risk medications identified  Taken 11/9/2023 2200 by Velma Reeder RN  Medication Review/Management:   medications reviewed   high-risk medications identified  Taken 11/9/2023 2000 by Velma Reeder RN  Medication Review/Management:   medications reviewed   high-risk medications identified     Problem: COPD (Chronic Obstructive Pulmonary Disease) Comorbidity  Goal: Maintenance of COPD Symptom Control  Outcome: Ongoing, Progressing  Intervention: Maintain COPD-Symptom Control  Recent Flowsheet Documentation  Taken 11/10/2023 0400 by Velma Reeder RN  Supportive Measures: active listening utilized  Medication Review/Management:   medications reviewed   high-risk medications identified  Taken 11/10/2023 0200 by Velma Reeder RN  Medication Review/Management:   medications reviewed   high-risk medications identified  Taken 11/10/2023 0000 by Velma Reeder RN  Supportive Measures:   active listening utilized   self-care encouraged  Medication Review/Management:   medications reviewed   high-risk medications identified  Taken 11/9/2023 2200 by Velma Reeder RN  Medication Review/Management:   medications reviewed   high-risk medications identified  Taken 11/9/2023 2000 by Velma Reeder RN  Supportive Measures: active listening utilized  Medication Review/Management:   medications reviewed   high-risk medications identified     Problem: Diabetes Comorbidity  Goal: Blood Glucose Level Within Targeted Range  Outcome: Ongoing, Progressing  Intervention: Monitor and Manage Glycemia  Recent Flowsheet Documentation  Taken 11/9/2023 2000 by Velma Reeder  RN  Glycemic Management: blood glucose monitored     Problem: Heart Failure Comorbidity  Goal: Maintenance of Heart Failure Symptom Control  Outcome: Ongoing, Progressing  Intervention: Maintain Heart Failure-Management  Recent Flowsheet Documentation  Taken 11/10/2023 0400 by Velma Reeder RN  Medication Review/Management:   medications reviewed   high-risk medications identified  Taken 11/10/2023 0200 by Velma Reeder RN  Medication Review/Management:   medications reviewed   high-risk medications identified  Taken 11/10/2023 0000 by Velma Reeder RN  Medication Review/Management:   medications reviewed   high-risk medications identified  Taken 11/9/2023 2200 by Velma Reeder RN  Medication Review/Management:   medications reviewed   high-risk medications identified  Taken 11/9/2023 2000 by Velma Reeder RN  Medication Review/Management:   medications reviewed   high-risk medications identified     Problem: Hypertension Comorbidity  Goal: Blood Pressure in Desired Range  Outcome: Ongoing, Progressing  Intervention: Maintain Blood Pressure Management  Recent Flowsheet Documentation  Taken 11/10/2023 0400 by Velma Reeder RN  Medication Review/Management:   medications reviewed   high-risk medications identified  Taken 11/10/2023 0200 by Velma Reeder RN  Medication Review/Management:   medications reviewed   high-risk medications identified  Taken 11/10/2023 0000 by Velma Reeder RN  Medication Review/Management:   medications reviewed   high-risk medications identified  Taken 11/9/2023 2200 by Velma Reeder RN  Medication Review/Management:   medications reviewed   high-risk medications identified  Taken 11/9/2023 2000 by Velma Reeder RN  Medication Review/Management:   medications reviewed   high-risk medications identified     Problem: Obstructive Sleep Apnea Risk or Actual Comorbidity Management  Goal: Unobstructed Breathing During Sleep  Outcome:  Ongoing, Progressing     Problem: Osteoarthritis Comorbidity  Goal: Maintenance of Osteoarthritis Symptom Control  Outcome: Ongoing, Progressing  Intervention: Maintain Osteoarthritis Symptom Control  Recent Flowsheet Documentation  Taken 11/10/2023 0400 by Velma Reeder RN  Activity Management: bedrest  Medication Review/Management:   medications reviewed   high-risk medications identified  Taken 11/10/2023 0200 by Velma Reeder RN  Activity Management: bedrest  Medication Review/Management:   medications reviewed   high-risk medications identified  Taken 11/10/2023 0000 by Velma Reeder RN  Activity Management: bedrest  Medication Review/Management:   medications reviewed   high-risk medications identified  Taken 11/9/2023 2200 by Velma Reeder RN  Activity Management: bedrest  Medication Review/Management:   medications reviewed   high-risk medications identified  Taken 11/9/2023 2000 by Velma Reeder RN  Activity Management: bedrest  Medication Review/Management:   medications reviewed   high-risk medications identified     Problem: Pain Chronic (Persistent) (Comorbidity Management)  Goal: Acceptable Pain Control and Functional Ability  Outcome: Ongoing, Progressing  Intervention: Manage Persistent Pain  Recent Flowsheet Documentation  Taken 11/10/2023 0400 by Velma Reeder RN  Medication Review/Management:   medications reviewed   high-risk medications identified  Taken 11/10/2023 0200 by Velma Reeder RN  Medication Review/Management:   medications reviewed   high-risk medications identified  Taken 11/10/2023 0000 by Velma Reeder RN  Medication Review/Management:   medications reviewed   high-risk medications identified  Taken 11/9/2023 2200 by Velma Reeder RN  Medication Review/Management:   medications reviewed   high-risk medications identified  Taken 11/9/2023 2000 by Velma Reeder RN  Medication Review/Management:   medications reviewed    high-risk medications identified  Intervention: Optimize Psychosocial Wellbeing  Recent Flowsheet Documentation  Taken 11/10/2023 0400 by Velma Reeder RN  Supportive Measures: active listening utilized  Family/Support System Care: support provided  Taken 11/10/2023 0000 by Velma Reeder RN  Supportive Measures:   active listening utilized   self-care encouraged  Family/Support System Care: support provided  Taken 11/9/2023 2000 by Velma Reeder RN  Supportive Measures: active listening utilized  Diversional Activities: television  Family/Support System Care: support provided     Problem: Seizure Disorder Comorbidity  Goal: Maintenance of Seizure Control  Outcome: Ongoing, Progressing     Problem: Skin Injury Risk Increased  Goal: Skin Health and Integrity  Outcome: Ongoing, Progressing  Intervention: Optimize Skin Protection  Recent Flowsheet Documentation  Taken 11/10/2023 0400 by Velma Reeder RN  Head of Bed (HOB) Positioning: HOB elevated  Taken 11/10/2023 0200 by Velma Reeder RN  Head of Bed (HOB) Positioning: HOB elevated  Taken 11/10/2023 0000 by Velma Reeder RN  Head of Bed (HOB) Positioning: HOB elevated  Taken 11/9/2023 2200 by Velma Reeder RN  Head of Bed (HOB) Positioning: HOB elevated  Taken 11/9/2023 2000 by Velma Reeder RN  Head of Bed (HOB) Positioning: HOB elevated     Problem: Pain Acute  Goal: Acceptable Pain Control and Functional Ability  Outcome: Ongoing, Progressing  Intervention: Prevent or Manage Pain  Recent Flowsheet Documentation  Taken 11/10/2023 0400 by Velma Reeder RN  Medication Review/Management:   medications reviewed   high-risk medications identified  Taken 11/10/2023 0200 by Velma Reeder RN  Medication Review/Management:   medications reviewed   high-risk medications identified  Taken 11/10/2023 0000 by Velma Reeder RN  Medication Review/Management:   medications reviewed   high-risk medications  identified  Taken 11/9/2023 2200 by Velma Reeder RN  Medication Review/Management:   medications reviewed   high-risk medications identified  Taken 11/9/2023 2000 by Velma Reeder RN  Medication Review/Management:   medications reviewed   high-risk medications identified  Intervention: Optimize Psychosocial Wellbeing  Recent Flowsheet Documentation  Taken 11/10/2023 0400 by Velma Reeder RN  Supportive Measures: active listening utilized  Taken 11/10/2023 0000 by Velma Reeder RN  Supportive Measures:   active listening utilized   self-care encouraged  Taken 11/9/2023 2000 by Velma Reeder RN  Supportive Measures: active listening utilized  Diversional Activities: television     Problem: Self-Care Deficit  Goal: Improved Ability to Complete Activities of Daily Living  Outcome: Ongoing, Progressing   Goal Outcome Evaluation:  Plan of Care Reviewed With: patient, spouse        Progress: improving

## 2023-11-10 NOTE — PLAN OF CARE
Goal Outcome Evaluation:  Plan of Care Reviewed With: patient, spouse           Outcome Evaluation: Anthony Lugo is a 83 y.o. male that was direct admit from PCPs office.  Patient suffered a fall today off of an elevated surface landing backwards striking the back of his head. His PCP ordered imaging which demonstrated small amount of hyperintensities in the bifrontal region suggestive of subarachnoid hemorrhage.  Orthopedics also consulted for reported pelvic fracture. Imaging confirms SAH. Systolic BP must now remain below 150. Repeat CT shows SAH is stable, with no change from original scan. At baseline, pt is very active living in [address] with his spouse and works full-time for his own company. Pt has supportive immediate and extended family that will be able to help when he goes back home. This date, pt reports 8/10 pain, but is able to participate in OT evaluation. Mod A needed for bod mobility. At EOB, Max A needed for lower body dressing. Min A required to come to standing and to transfer via use of RW. Max A required for toileting for hygiene. Pts family intends to take him home and assist as needed. OT feels during this time HHOT would be beneficial. Pt would be unable to tolerate inpatient rehab during the acute stages of healing secondary to pain levels. Recommend a RW and 3-in-1 combo chair upon dc to increase safety and assist with ADL care.      Anticipated Discharge Disposition (OT): home with 24/7 care, home with home health

## 2023-11-10 NOTE — DISCHARGE PLACEMENT REQUEST
"Anthony Lguo (83 y.o. Male)       Date of Birth   1940    Social Security Number       Address   [address] [place] [address]    Home Phone   [phone]    MRN   [id number]       Roman Catholic   M[employer]on    Marital Status                               Admission Date   11/9/23    Admission Type   Elective    Admitting Provider   Andres Leong MD    Attending Provider   Andres Leong MD    Department, Room/Bed   [facility], 242/1       Discharge Date       Discharge Disposition       Discharge Destination                                 Attending Provider: Andres Leong MD    Allergies: No Known Allergies    Isolation: None   Infection: None   Code Status: CPR    Ht: 170.2 cm (67\")   Wt: 70.6 kg (155 lb 10.3 oz)    Admission Cmt: None   Principal Problem: Subarachnoid hemorrhage after traumatic injury without open intracranial wound, with prolonged loss of consciousness and return to pre-existing level of consciousness [S06.6X9A]                   Active Insurance as of 11/9/2023       Primary Coverage       Payor Plan Insurance Group Employer/Plan Group    MEDICARE MEDICARE A & B        Payor Plan Address Payor Plan Phone Number Payor Plan Fax Number Effective Dates    [address] [phone]  4/1/2006 - None Entered    [facility] [id number]         Subscriber Name Subscriber Birth Date Member ID       ANTHONY LUGO 1940 [id number]               Secondary Coverage       Payor Plan Insurance Group Employer/Plan Group    [employer]           PLAN G       Payor Plan Address Payor Plan Phone Number Payor Plan Fax Number Effective Dates    [address]   12/1/2017 - None Entered    [place] [address]         Subscriber Name Subscriber Birth Date Member ID       ANTHONY LUGO 1940 [id number]                     Emergency Contacts        (Rel.) Home Phone Work Phone Mobile Phone    " charity pérez (Spouse) [phone]    Dilip Pérez (Son) [phone] -- [phone]                 History & Physical        Andres Leong MD at [phone]                  History and Physical      Name: Anthony Pérez ADMIT: 2023   : 1940  PROVIDER: Andres Leong MD    MRN: [id number] LOS: 1 days   AGE/SEX: 83 y.o. male  ROOM: [facility]     Date of Service: 2023                        Chief Complaint:       Fell at work with blunt trauma to back of head and pelvis.  CT positive for subarachnoid hemorrhage and bilateral pelvic fracture.    History of Present Illness:       Amador Pérez is a very pleasant and generally functionally active 83-year-old WM well-known to me with HPTN, remote prostate CA, HLD, and moderate bilateral sensorineural hearing loss who presented to my office today after falling backwards off of a 40 inch table onto concrete producing occipital laceration and severe right hip pain.  In the office, he was found to have right periorbital ecchymoses, and approximately 3.5 cm irregular occipital scalp laceration/abrasion and severe tenderness over his right iliac crest and pelvis with soft tissue ecchymoses and a very antalgic gait.  After cleaning up the wounds his scalp laceration was sutured and he was sent directly to CT scan for stat head and pelvic CT imaging.  CT images revealed a small subarachnoid hemorrhage with bilateral pelvic fractures, nondisplaced.  He was subsequently made direct admission for further evaluation and treatment of acute head and pelvic trauma.    Review of Systems:         Limited ROS history available due to acute pain pain from his earlier fall.  He denied headache visual changes or any change in his hearing or swallowing.  He denies chest pain or palpitations and has no difficulty with shortness of air.  No abdominal pain.  Denies incontinence.  No specific musculoskeletal deformity or loss of  sensorimotor function despite very antalgic gait with limited weightbearing.    Past medical history, surgical history, social history, family history, allergies and all medications reviewed and agreed.      Past History/Allergies?Social History:     Past Medical History:   Diagnosis Date    Hypertension     Mumps          Past Surgical History :     Past Surgical History:   Procedure Laterality Date    COLONOSCOPY  2013    PROSTATECTOMY  2001          Family History:     Family History   Problem Relation Age of Onset    Asthma Other           Social History:     Social History     Tobacco Use    Smoking status: Never    Smokeless tobacco: Never   Substance Use Topics    Alcohol use: Yes     Comment: Social          Allergies:     No Known Allergies      Home meds:     Medications Prior to Admission   Medication Sig Dispense Refill Last Dose    aspirin 81 MG EC tablet Take 1 tablet by mouth Daily.   11/9/2023    azelastine (ASTELIN) 0.1 % nasal spray 1 spray into the nostril(s) as directed by provider 2 (Two) Times a Day.   11/9/2023    calcium carbonate (OS-CHRISTIAN) 600 MG tablet Take 1 tablet by mouth Daily.   11/9/2023    Cholecalciferol (VITAMIN D-3) 1000 units capsule 1,000 Units Daily.   11/9/2023    Co-Enzyme Q-10 100 MG capsule Take 1 capsule by mouth Daily.   11/9/2023    Multiple Vitamins-Minerals (CENTRUM SILVER ADULT 50+) tablet CENTRUM SILVER ADULT 50+ TABS   11/9/2023    Omega-3 Fatty Acids (FISH OIL) 1000 MG capsule delayed-release FISH OIL 1000 MG CPDR   11/9/2023    Specialty Vitamins Products (HEALTHY HEART COMPLEX) tablet HEALTHY HEART COMPLEX TABS   11/9/2023    telmisartan-hydrochlorothiazide (MICARDIS HCT) 80-12.5 MG per tablet Take 1 tablet by mouth Daily.  2 11/9/2023    cefdinir (OMNICEF) 300 MG capsule Take 1 capsule by mouth 2 (Two) Times a Day. 20 capsule 0     prednisoLONE acetate (PRED FORTE) 1 % ophthalmic suspension Apply 1 % to eye(s) as directed by provider Daily.  0     tacrolimus  "(PROTOPIC) 0.1 % ointment Apply  topically to the appropriate area as directed.            Scheduled meds:   losartan, 100 mg, Oral, Q24H  lubiprostone, 24 mcg, Oral, BID With Meals  senna-docusate sodium, 2 tablet, Oral, BID  sodium chloride, 10 mL, Intravenous, Q12H          Objectives:     tMax 24 hrs: Temp (24hrs), Av.6 °F (36.4 °C), Min:97.6 °F (36.4 °C), Max:97.6 °F (36.4 °C)      Vitals Ranges:   Temp:  [97.6 °F (36.4 °C)] 97.6 °F (36.4 °C)  Heart Rate:  [61-68] 61  Resp:  [19-22] 19  BP: (190-206)/(84-91) 190/84    Intake and Output Last 3 Shifts:   No intake/output data recorded.      Exam:     BP (!) 190/84   Pulse 61   Temp 97.6 °F (36.4 °C) (Oral)   Resp 19   Ht 170.2 cm (67\")   Wt 65.8 kg (145 lb)   SpO2 97%   BMI 22.71 kg/m²     HEENT: Irregular triquetral scalp fracture/laceration of the occipital region approximately 3.5 cm-now sutured.  Right periorbital ecchymoses.  PERRL; EOMI, sclera clear; nostrils and oropharynx clear with dentition intact; tongue midline.  Neck: Supple without adenopathy; no JVD or bruits  Lungs: CTA with adequate global airflow and no specific adventitious sounds  Heart: RRR without significant murmur  Abd: Soft, NT, ND, BS positive, no hepatosplenomegaly  Ext: Decreased range of motion in both lower extremities with very antalgic gait extreme tenderness of the pelvis and iliac crest bilaterally  Neuro: CN grossly intact, no focal deficits  Skin: Periorbital ecchymoses with minor abrasion and significant occipital scalp laceration described above        Data Review:       Lab Results (last 24 hours)       Procedure Component Value Units Date/Time    Comprehensive Metabolic Panel [[id number]]  (Abnormal) Collected: 23 1438    Specimen: Blood Updated: 23 1517     Glucose 165 mg/dL      BUN 29 mg/dL      Creatinine 0.94 mg/dL      Sodium 138 mmol/L      Potassium 4.6 mmol/L      Chloride 102 mmol/L      CO2 25.0 mmol/L      Calcium 9.7 mg/dL      Total " Protein 6.4 g/dL      Albumin 3.9 g/dL      ALT (SGPT) 30 U/L      AST (SGOT) 38 U/L      Alkaline Phosphatase 54 U/L      Total Bilirubin 1.0 mg/dL      Globulin 2.5 gm/dL      A/G Ratio 1.6 g/dL      BUN/Creatinine Ratio 30.9     Anion Gap 11.0 mmol/L      eGFR 80.9 mL/min/1.73     Narrative:      GFR Normal >60  Chronic Kidney Disease <60  Kidney Failure <15    The GFR formula is only valid for adults with stable renal function between ages 18 and 70.    aPTT [[id number]]  (Abnormal) Collected: 11/09/23 1438    Specimen: Blood from Arm, Right Updated: 11/09/23 1513     PTT 23.4 seconds     Protime-INR [[id number]]  (Normal) Collected: 11/09/23 1438    Specimen: Blood from Arm, Right Updated: 11/09/23 1513     Protime 11.1 Seconds      INR 1.02    CBC Auto Differential [[id number]]  (Abnormal) Collected: 11/09/23 1438    Specimen: Blood Updated: 11/09/23 1502     WBC 15.10 10*3/mm3      RBC 4.07 10*6/mm3      Hemoglobin 13.4 g/dL      Hematocrit 39.5 %      MCV 97.0 fL      MCH 32.8 pg      MCHC 33.8 g/dL      RDW 13.0 %      RDW-SD 45.9 fl      MPV 7.6 fL      Platelets 219 10*3/mm3      Neutrophil % 91.1 %      Lymphocyte % 2.8 %      Monocyte % 6.1 %      Eosinophil % 0.0 %      Basophil % 0.0 %      Neutrophils, Absolute 13.70 10*3/mm3      Lymphocytes, Absolute 0.40 10*3/mm3      Monocytes, Absolute 0.90 10*3/mm3      Eosinophils, Absolute 0.00 10*3/mm3      Basophils, Absolute 0.00 10*3/mm3      nRBC 0.0 /100 WBC                   Imaging:      Imaging Results (Last 24 Hours)       ** No results found for the last 24 hours. **          No orders to display   C      Assessment:      Principal Problem:    Subarachnoid hemorrhage after traumatic injury without open intracranial wound, with prolonged loss of consciousness and return to pre-existing level of consciousness      1.  83-year-old WM with subarachnoid hemorrhage and scalp laceration secondary to fall with blunt trauma    2.  Nondisplaced bilateral pelvic  fracture-secondary to fall    3.  Essential hypertension-mildly accelerated on admission; generally well regulated on telmisartan with HCTZ    4 mild hyperlipidemia-not on statin therapy    5.  Moderately severe bilateral sensorineural hearing loss with bilateral hearing aids    6.  History of osteopenia-maintained on calcium and vitamin D supplements    Plan:     Admit to general medical [facility] for close observation with neurochecks every 2 hours and follow-up head CT in approximately 6 hours   Vigilant blood pressure regulation; maintain systolic blood pressure below 150 mmHg  Neurosurgery consult  Orthopedic consult  Analgesia, DVT prophylaxis, GI prophylaxis and additional supportive care as indicated     Clinical condition and further diagnostic and therapeutic plans discussed with patient, wife at bedside as well as bedside nurse.  Further recommendations pending clinical course        Andres Leong MD  [facility]  11/9/2023  16:06 EST    Electronically signed by Andres Leong MD at 11/09/23 4254

## 2023-11-10 NOTE — PLAN OF CARE
Goal Outcome Evaluation:  Plan of Care Reviewed With: patient, spouse        Progress: improving  Outcome Evaluation: Pt is an 82 y/o M admitted to [facility] on 11/9/23 directly from Dr. Brian's office s/p fall at work. CT head (+) small subarachnoid hemorrhage bilat, R greater than L without midline shift. CT pelvis (+) medial R and L iliac fractures. Neurosurgery reports strict BP control maintain SBP < 150. At baseline, pt is [address] [place] [address] [place] and works full-time for his own company. Pt has supportive immediate and extended family that will be able to help when he goes back home. Edu pt regarding use of FWW for supporting pt when standing and ambulating for reducing pain and reducing weight-bearing BLE. Pt required Gene for supine>sit EOB, CGA-Gene for STS, and CGA for amb to bedside chair. Pt limited mostly d/t significant pain as related to pelvic fracture. Discussed d/c planning with patient, spouse, and patient's PCP. Pt will be safe to d/c home with family assist and would benefit from home health PT initially while pt heals and as his pain decreases, then he would benefit from OPPT for increased intensity. Pt will need RW and bedside commode upon d/c. Plan for PT to come saturday AM to work on stairs for improving safety when d/c home.      Anticipated Discharge Disposition (PT): home with home health

## 2023-11-10 NOTE — PROGRESS NOTES
PROGRESS NOTE      Name: Anthony Lugo ADMIT: 2023   : 1940  PROVIDER: Andres Leong MD    MRN: [id number] LOS: 1 days   AGE/SEX: 83 y.o. male  ROOM: [facility]/     Date of Service: 11/10/2023                           CHIEF COMPLIANTS / REASON FOR FOLLOW UP        Subarachnoid hemorrhage and bilateral pelvic fracture secondary to fall 2023    Subjective:      Very sore low back and pelvis, though able to stand and ambulate with assistance.  Minimal headache without visual changes, dizziness, nausea/vomiting or any focal/lateralizing sensorimotor deficits.  Overall, looks good in view of significant fall injury.  Patient's wife, Marzena present in room throughout my exam/visit today.       Review of Systems:       Constitutional: Very sore and slow-moving as expected with no other significant constitutional complaints  HEENT: No significant headache, no epistaxis, no difficulty swallowing or change in hearing  Respiratory: No shortness of air or cough no mild soreness/pain on right-side chest with deep inspiratory effort  Cardiac: No chest pain palpitations or CHF symptoms  GI: No nausea vomiting or bowel changes  : No hematuria or dysuria  Extremities: Adequate ROM though very painful moving lower extremities  Neuro: No change in cognition; no sensorimotor deficits  Remainder ROS unremarkable         ASSESSMENT:                                                                            Principal Problem:    Subarachnoid hemorrhage after traumatic injury without open intracranial wound, with prolonged loss of consciousness and return to pre-existing level of consciousness       1.  83-year-old WM with subarachnoid hemorrhage and scalp laceration secondary to fall with blunt trauma     2.  Nondisplaced bilateral pelvic fracture-secondary to fall     3.  Essential hypertension-mildly accelerated on admission; generally well regulated on telmisartan with HCTZ     4 mild  "hyperlipidemia-not on statin therapy     5.  Moderately severe bilateral sensorineural hearing loss with bilateral hearing aids     6.  History of osteopenia-maintained on calcium and vitamin D supplements      PLAN:                                                                            Continue aggressive supportive care with analgesia and physical therapy.  Reviewed repeat head CT-unchanged; stable  Vigilant management of essential hypertension; maintaining systolic blood pressure below 150 with ARB plus as needed hydralazine  Making arrangements for outpatient physical therapy and at home assistance with anticipation of discharge to home in a.m.       OBJECTIVE                                                                        Exam:  /75 (BP Location: Right arm, Patient Position: Lying)   Pulse 79   Temp 98.5 °F (36.9 °C) (Oral)   Resp 28   Ht 170.2 cm (67\")   Wt 70.6 kg (155 lb 10.3 oz)   SpO2 96%   BMI 24.38 kg/m²   Intake/Output last 3 shifts:  I/O last 3 completed shifts:  In: 240 [P.O.:240]  Out: 925 [Urine:925]  Intake/Output this shift:  I/O this shift:  In: 240 [P.O.:240]  Out: 200 [Urine:200]    General Appearance:  Alert, cooperative, no distress, appears stated age  HEENT: Occipital laceration looks good without drainage.  Right periorbital ecchymoses with minimal swelling.  PERRL; EOMI, sclera clear, nose and oropharynx unremarkable with mucosa moist  Neck:  Supple, no adenopathy; no JVD or bruits  Lungs:   Clear to auscultation with marginally adequate airflow-some splinting on deep inspiratory effort and tenderness over right lateral chest wall  Heart: RRR with normal S1-S2 no murmurs or rub  Abdomen:   Soft, nontender, no masses, no hepatomegaly, no splenomegaly  Extremities: Adequate ROM without peripheral cyanosis or edema; good distal pulses.  Neurologic:   Alert and oriented, no focal deficits    Scheduled Meds:cholecalciferol, 1,000 Units, Oral, Daily  losartan, 100 mg, " Oral, Q24H  lubiprostone, 24 mcg, Oral, BID With Meals  melatonin, 10 mg, Oral, Nightly  pantoprazole, 40 mg, Oral, QAM AC  prednisoLONE acetate, 1 drop, Both Eyes, Daily  senna-docusate sodium, 2 tablet, Oral, BID  sodium chloride, 10 mL, Intravenous, Q12H      Continuous Infusions:   PRN Meds:  acetaminophen    senna-docusate sodium **AND** polyethylene glycol **AND** bisacodyl **AND** bisacodyl    hydrALAZINE    HYDROmorphone    oxyCODONE    sodium chloride    sodium chloride         Data Review:                                                                              Lab Results (last 24 hours)       Procedure Component Value Units Date/Time    Basic Metabolic Panel [[id number]]  (Abnormal) Collected: 11/10/23 0143    Specimen: Blood Updated: 11/10/23 0255     Glucose 129 mg/dL      BUN 22 mg/dL      Creatinine 0.90 mg/dL      Sodium 138 mmol/L      Potassium 3.6 mmol/L      Chloride 102 mmol/L      CO2 25.0 mmol/L      Calcium 9.0 mg/dL      BUN/Creatinine Ratio 24.4     Anion Gap 11.0 mmol/L      eGFR 85.3 mL/min/1.73     Narrative:      GFR Normal >60  Chronic Kidney Disease <60  Kidney Failure <15    The GFR formula is only valid for adults with stable renal function between ages 18 and 70.    CBC (No Diff) [[id number]]  (Abnormal) Collected: 11/10/23 0143    Specimen: Blood Updated: 11/10/23 0227     WBC 8.20 10*3/mm3      RBC 3.73 10*6/mm3      Hemoglobin 12.3 g/dL      Hematocrit 36.1 %      MCV 96.7 fL      MCH 32.9 pg      MCHC 34.1 g/dL      RDW 13.2 %      RDW-SD 46.8 fl      MPV 7.8 fL      Platelets 203 10*3/mm3     Urinalysis With Microscopic If Indicated (No Culture) - Urine, Clean Catch [[id number]]  (Normal) Collected: 11/09/23 1743    Specimen: Urine, Clean Catch Updated: 11/09/23 1808     Color, UA Yellow     Appearance, UA Clear     pH, UA 6.0     Specific Gravity, UA 1.026     Glucose, UA Negative     Ketones, UA Negative     Bilirubin, UA Negative     Blood, UA Negative     Protein, UA  Negative     Leuk Esterase, UA Negative     Nitrite, UA Negative     Urobilinogen, UA 0.2 E.U./dL    Narrative:      Urine microscopic not indicated.    Comprehensive Metabolic Panel [[id number]]  (Abnormal) Collected: 11/09/23 1438    Specimen: Blood Updated: 11/09/23 1517     Glucose 165 mg/dL      BUN 29 mg/dL      Creatinine 0.94 mg/dL      Sodium 138 mmol/L      Potassium 4.6 mmol/L      Chloride 102 mmol/L      CO2 25.0 mmol/L      Calcium 9.7 mg/dL      Total Protein 6.4 g/dL      Albumin 3.9 g/dL      ALT (SGPT) 30 U/L      AST (SGOT) 38 U/L      Alkaline Phosphatase 54 U/L      Total Bilirubin 1.0 mg/dL      Globulin 2.5 gm/dL      A/G Ratio 1.6 g/dL      BUN/Creatinine Ratio 30.9     Anion Gap 11.0 mmol/L      eGFR 80.9 mL/min/1.73     Narrative:      GFR Normal >60  Chronic Kidney Disease <60  Kidney Failure <15    The GFR formula is only valid for adults with stable renal function between ages 18 and 70.    aPTT [[id number]]  (Abnormal) Collected: 11/09/23 1438    Specimen: Blood from Arm, Right Updated: 11/09/23 1513     PTT 23.4 seconds     Protime-INR [[id number]]  (Normal) Collected: 11/09/23 1438    Specimen: Blood from Arm, Right Updated: 11/09/23 1513     Protime 11.1 Seconds      INR 1.02    CBC Auto Differential [[id number]]  (Abnormal) Collected: 11/09/23 1438    Specimen: Blood Updated: 11/09/23 1502     WBC 15.10 10*3/mm3      RBC 4.07 10*6/mm3      Hemoglobin 13.4 g/dL      Hematocrit 39.5 %      MCV 97.0 fL      MCH 32.8 pg      MCHC 33.8 g/dL      RDW 13.0 %      RDW-SD 45.9 fl      MPV 7.6 fL      Platelets 219 10*3/mm3      Neutrophil % 91.1 %      Lymphocyte % 2.8 %      Monocyte % 6.1 %      Eosinophil % 0.0 %      Basophil % 0.0 %      Neutrophils, Absolute 13.70 10*3/mm3      Lymphocytes, Absolute 0.40 10*3/mm3      Monocytes, Absolute 0.90 10*3/mm3      Eosinophils, Absolute 0.00 10*3/mm3      Basophils, Absolute 0.00 10*3/mm3      nRBC 0.0 /100 WBC                  Imaging:                                                                              Imaging Results (Last 24 Hours)       Procedure Component Value Units Date/Time    CT Head Without Contrast [[id number]] Collected: 11/09/23 1810     Updated: 11/09/23 1814    Narrative:      CT HEAD WO CONTRAST    Date of Exam: 11/9/2023 5:48 PM EST    Indication: f/u SAH.    Comparison: CT head without contrast 11/9/2023 12:09 p.m.    Technique: Axial CT images were obtained of the head without contrast administration.  Coronal reconstructions were performed.  Automated exposure control and iterative reconstruction methods were used.      Findings:  Small moderate subarachnoid hemorrhage in the inferior bilateral frontal lobes. This is unchanged compared to previous study. No new sites of intracranial hemorrhage.    Brain volume is appropriate for patient's age. Sulci ventricle size is symmetric. The gray-white matter differentiation is intact. There are no focal hypodensities to suggest acute or subacute infarct. There is mild soft tissue swelling of the posterior   scalp. The globes and extraocular muscles are normal in appearance. Mastoid air cells are well-aerated. Mucosal thickening in the maxillary sinuses, sphenoid sinuses ethmoid air cells and inferior frontal sinuses. No displaced or depressed skull   fracture.      Impression:      Impression:  No change from previous study. Small amount of subarachnoid hemorrhage in the inferior anterior frontal lobes.      Electronically Signed: Katja Hu MD    11/9/2023 6:12 PM EST    Workstation ID: [id number]          SCANNED - TELEMETRY     Final Result      SCANNED - TELEMETRY     Final Result      SCANNED - TELEMETRY     Final Result      SCANNED - TELEMETRY     Final Result      SCANNED - TELEMETRY     Final Result      SCANNED - TELEMETRY     Final Result               Andres Leong MD  [facility]  11/10/2023  14:09 EST

## 2023-11-10 NOTE — PROGRESS NOTES
NEUROSURGERY PROGRESS NOTE     LOS: 1 day   Patient Care Team:  Andres Leong MD as PCP - General (Internal Medicine)  León Adame MD as Consulting Physician (Cardiology)    Chief Complaint: Subarachnoid hemorrhage    Subjective     Interval History:     No acute events overnight.  Reports some increased generalized soreness from his fall today.  He reports no headache but some tenderness along back of his head where he had a laceration.  He reports no dizziness, nausea/vomiting, visual dysfunction, speech issues or focal sensorimotor deficits.        History taken from: patient chart family    Objective      Vital Signs  Temp:  [97.6 °F (36.4 °C)-98.5 °F (36.9 °C)] 98.5 °F (36.9 °C)  Heart Rate:  [61-83] 63  Resp:  [15-23] 15  BP: (114-206)/(57-91) 148/76  Body mass index is 24.38 kg/m².    Intake/Output last 3 shifts:  I/O last 3 completed shifts:  In: 240 [P.O.:240]  Out: 650 [Urine:650]    Intake/Output this shift:  No intake/output data recorded.    Physical    General:  Awake, alert, and oriented x 3. NAD  CN III IV VI:  Extraocular movements are full , PERRL   CN V:   Normal facial sensation and strength of muscles of mastication.  CN VII:  Facial movements are symmetric. No weakness.  Motor:   Normal upper extremity motor strength, no arm drift.  Complete testing of lower extremity motor strength impaired secondary to pelvic injury/pain  Extremities:   Patient is not wearing SCD bilaterally    Results Review:     I reviewed the patient's new clinical results.  I reviewed the patient's new imaging results and agree with the interpretation.    Labs:    Lab Results (last 24 hours)       Procedure Component Value Units Date/Time    CBC Auto Differential [[id number]]  (Abnormal) Collected: 11/09/23 1438    Specimen: Blood Updated: 11/09/23 1502     WBC 15.10 10*3/mm3      RBC 4.07 10*6/mm3      Hemoglobin 13.4 g/dL      Hematocrit 39.5 %      MCV 97.0 fL      MCH 32.8 pg      MCHC 33.8  g/dL      RDW 13.0 %      RDW-SD 45.9 fl      MPV 7.6 fL      Platelets 219 10*3/mm3      Neutrophil % 91.1 %      Lymphocyte % 2.8 %      Monocyte % 6.1 %      Eosinophil % 0.0 %      Basophil % 0.0 %      Neutrophils, Absolute 13.70 10*3/mm3      Lymphocytes, Absolute 0.40 10*3/mm3      Monocytes, Absolute 0.90 10*3/mm3      Eosinophils, Absolute 0.00 10*3/mm3      Basophils, Absolute 0.00 10*3/mm3      nRBC 0.0 /100 WBC     aPTT [[id number]]  (Abnormal) Collected: 11/09/23 1438    Specimen: Blood from Arm, Right Updated: 11/09/23 1513     PTT 23.4 seconds     Comprehensive Metabolic Panel [[id number]]  (Abnormal) Collected: 11/09/23 1438    Specimen: Blood Updated: 11/09/23 1517     Glucose 165 mg/dL      BUN 29 mg/dL      Creatinine 0.94 mg/dL      Sodium 138 mmol/L      Potassium 4.6 mmol/L      Chloride 102 mmol/L      CO2 25.0 mmol/L      Calcium 9.7 mg/dL      Total Protein 6.4 g/dL      Albumin 3.9 g/dL      ALT (SGPT) 30 U/L      AST (SGOT) 38 U/L      Alkaline Phosphatase 54 U/L      Total Bilirubin 1.0 mg/dL      Globulin 2.5 gm/dL      A/G Ratio 1.6 g/dL      BUN/Creatinine Ratio 30.9     Anion Gap 11.0 mmol/L      eGFR 80.9 mL/min/1.73     Narrative:      GFR Normal >60  Chronic Kidney Disease <60  Kidney Failure <15    The GFR formula is only valid for adults with stable renal function between ages 18 and 70.    Protime-INR [[id number]]  (Normal) Collected: 11/09/23 1438    Specimen: Blood from Arm, Right Updated: 11/09/23 1513     Protime 11.1 Seconds      INR 1.02    Urinalysis With Microscopic If Indicated (No Culture) - Urine, Clean Catch [[id number]]  (Normal) Collected: 11/09/23 1743    Specimen: Urine, Clean Catch Updated: 11/09/23 1808     Color, UA Yellow     Appearance, UA Clear     pH, UA 6.0     Specific Gravity, UA 1.026     Glucose, UA Negative     Ketones, UA Negative     Bilirubin, UA Negative     Blood, UA Negative     Protein, UA Negative     Leuk Esterase, UA Negative     Nitrite, UA  Negative     Urobilinogen, UA 0.2 E.U./dL    Narrative:      Urine microscopic not indicated.    CBC (No Diff) [[id number]]  (Abnormal) Collected: 11/10/23 0143    Specimen: Blood Updated: 11/10/23 0227     WBC 8.20 10*3/mm3      RBC 3.73 10*6/mm3      Hemoglobin 12.3 g/dL      Hematocrit 36.1 %      MCV 96.7 fL      MCH 32.9 pg      MCHC 34.1 g/dL      RDW 13.2 %      RDW-SD 46.8 fl      MPV 7.8 fL      Platelets 203 10*3/mm3     Basic Metabolic Panel [[id number]]  (Abnormal) Collected: 11/10/23 0143    Specimen: Blood Updated: 11/10/23 0255     Glucose 129 mg/dL      BUN 22 mg/dL      Creatinine 0.90 mg/dL      Sodium 138 mmol/L      Potassium 3.6 mmol/L      Chloride 102 mmol/L      CO2 25.0 mmol/L      Calcium 9.0 mg/dL      BUN/Creatinine Ratio 24.4     Anion Gap 11.0 mmol/L      eGFR 85.3 mL/min/1.73     Narrative:      GFR Normal >60  Chronic Kidney Disease <60  Kidney Failure <15    The GFR formula is only valid for adults with stable renal function between ages 18 and 70.            Imaging:    I personally reviewed the images from the following radiographic studies.    CT head 11/9/2023    Redemonstration of small amount of hyperintensity along the bifrontal lobes right over left consistent with traumatic subarachnoid hemorrhage.  No mass effect, no midline shift.  There are no new acute findings noted.    Current Medications:   Scheduled Meds:cholecalciferol, 1,000 Units, Oral, Daily  losartan, 100 mg, Oral, Q24H  lubiprostone, 24 mcg, Oral, BID With Meals  melatonin, 10 mg, Oral, Nightly  pantoprazole, 40 mg, Oral, QAM AC  prednisoLONE acetate, 1 drop, Both Eyes, Daily  senna-docusate sodium, 2 tablet, Oral, BID  sodium chloride, 10 mL, Intravenous, Q12H      Continuous Infusions:     Assessment & Plan       Subarachnoid hemorrhage after traumatic injury without open intracranial wound, with prolonged loss of consciousness and return to pre-existing level of consciousness      Assessment: Patient is an  83-year-old male being followed for small traumatic subarachnoid hemorrhage along his bifrontal lobes.  He remains neurologically stable.  Repeat imaging is reassuring for stability.  No further imaging recommended at this time.  Neurosurgery is recommending follow-up in outpatient clinic in 2 weeks with repeat CT head for surveillance purposes.  Neurosurgery will sign off at this time but be available for any questions or concerns.    Plan:      -Continue recommendations with systolic blood pressure less than 150  - Every 2 hour neurochecks  - Medical management pain control per primary  - Please call with questions or concerns.    I discussed my findings with patient, spouse and Dr. Brink.        Malathi Alarcon, APRN  11/10/23  09:17 EST

## 2023-11-10 NOTE — H&P
"   History & Physical       Patient: Anthony Lugo    Proposed Surgery and date:      YOB: 1940    Medical Record Number: [id number]  Wt Readings from Last 3 Encounters:   11/10/23 70.6 kg (155 lb 10.3 oz)   06/20/22 69.4 kg (153 lb)   03/28/22 70 kg (154 lb 6.4 oz)     Ht Readings from Last 3 Encounters:   11/09/23 170.2 cm (67\")   06/20/22 172 cm (67.72\")   03/28/22 172 cm (67.72\")     Body mass index is 24.38 kg/m².  Facility age limit for growth %chava is 20 years.      Surgeon:  Dr. Amrik Noonan M.D.        Chief Complaints: Pain    History of Present Illness: 83 y.o. male presents with pelvic and sacral fractures status post fall.   Patient slipped off table while working yesterday.  Was able to ambulate following the accident.  Denies any numbness tingling in the legs or feet  Allergies: No Known Allergies    Medications:   Home Medications:  No current facility-administered medications on file prior to encounter.     Current Outpatient Medications on File Prior to Encounter   Medication Sig    calcium carbonate (OS-CHRISTIAN) 600 MG tablet Take 1 tablet by mouth Daily.    Cholecalciferol (VITAMIN D-3) 1000 units capsule 1,000 Units Daily.    polyethyl glycol-propyl glycol (SYSTANE) 0.4-0.3 % solution ophthalmic solution (artificial tears) Administer 2 drops to both eyes Daily.    telmisartan-hydrochlorothiazide (MICARDIS HCT) 80-12.5 MG per tablet Take 1 tablet by mouth Daily.     Current Medications:  Scheduled Meds:celecoxib, 200 mg, Oral, Q12H  cholecalciferol, 1,000 Units, Oral, Daily  hydrALAZINE, 25 mg, Oral, Q12H  losartan, 100 mg, Oral, Q24H  lubiprostone, 24 mcg, Oral, BID With Meals  melatonin, 10 mg, Oral, Nightly  pantoprazole, 40 mg, Oral, QAM AC  prednisoLONE acetate, 1 drop, Both Eyes, Daily  senna-docusate sodium, 2 tablet, Oral, BID  sodium chloride, 10 mL, Intravenous, Q12H      Continuous Infusions:   PRN Meds:.  acetaminophen    senna-docusate sodium **AND** polyethylene glycol " **AND** bisacodyl **AND** bisacodyl    hydrALAZINE    HYDROmorphone    oxyCODONE    sodium chloride    sodium chloride    Past Medical History:   Diagnosis Date    Hypertension     Mumps         Past Surgical History:   Procedure Laterality Date    COLONOSCOPY  2013    PROSTATECTOMY  2001        Social History     Occupational History    Occupation: [employer]   Tobacco Use    Smoking status: Never    Smokeless tobacco: Never   Vaping Use    Vaping Use: Never used   Substance and Sexual Activity    Alcohol use: Yes     Comment: Social    Drug use: No    Sexual activity: Defer      Social History     Social History Narrative    Not on file        Family History   Problem Relation Age of Onset    Asthma Other          Review of Systems: 14 point review of systems was performed and was negative except as documented in the history of present illness.      Physical Exam: 83 y.o. male    Vital signs reviewed.    General Appearance:    Alert, cooperative, in no acute distress                  General: alert, oriented  Eyes: conjunctiva clear  ENT: external ears and nose atraumatic  CV: no peripheral edema  Resp: normal respiratory effort  Skin: no rashes or wounds; normal turgor  Psych: mood and affect appropriate  Lymph: no nodes appreciated  Neuro: gross sensation intact  Vascular:  Palpable peripheral pulse in noted extremity  Musculoskeletal Extremities: 5 out of 5 strength throughout the lower extremities.  No instability with palpation of the pelvis            Diagnostic Tests:  Lab Results (last 7 days)       Procedure Component Value Units Date/Time    Basic Metabolic Panel [[id number]]  (Abnormal) Collected: 11/10/23 0143    Specimen: Blood Updated: 11/10/23 0255     Glucose 129 mg/dL      BUN 22 mg/dL      Creatinine 0.90 mg/dL      Sodium 138 mmol/L      Potassium 3.6 mmol/L      Chloride 102 mmol/L      CO2 25.0 mmol/L      Calcium 9.0 mg/dL      BUN/Creatinine Ratio 24.4     Anion Gap 11.0 mmol/L      eGFR  85.3 mL/min/1.73     Narrative:      GFR Normal >60  Chronic Kidney Disease <60  Kidney Failure <15    The GFR formula is only valid for adults with stable renal function between ages 18 and 70.    CBC (No Diff) [[id number]]  (Abnormal) Collected: 11/10/23 0143    Specimen: Blood Updated: 11/10/23 0227     WBC 8.20 10*3/mm3      RBC 3.73 10*6/mm3      Hemoglobin 12.3 g/dL      Hematocrit 36.1 %      MCV 96.7 fL      MCH 32.9 pg      MCHC 34.1 g/dL      RDW 13.2 %      RDW-SD 46.8 fl      MPV 7.8 fL      Platelets 203 10*3/mm3     Urinalysis With Microscopic If Indicated (No Culture) - Urine, Clean Catch [[id number]]  (Normal) Collected: 11/09/23 1743    Specimen: Urine, Clean Catch Updated: 11/09/23 1808     Color, UA Yellow     Appearance, UA Clear     pH, UA 6.0     Specific Gravity, UA 1.026     Glucose, UA Negative     Ketones, UA Negative     Bilirubin, UA Negative     Blood, UA Negative     Protein, UA Negative     Leuk Esterase, UA Negative     Nitrite, UA Negative     Urobilinogen, UA 0.2 E.U./dL    Narrative:      Urine microscopic not indicated.    Comprehensive Metabolic Panel [[id number]]  (Abnormal) Collected: 11/09/23 1438    Specimen: Blood Updated: 11/09/23 1517     Glucose 165 mg/dL      BUN 29 mg/dL      Creatinine 0.94 mg/dL      Sodium 138 mmol/L      Potassium 4.6 mmol/L      Chloride 102 mmol/L      CO2 25.0 mmol/L      Calcium 9.7 mg/dL      Total Protein 6.4 g/dL      Albumin 3.9 g/dL      ALT (SGPT) 30 U/L      AST (SGOT) 38 U/L      Alkaline Phosphatase 54 U/L      Total Bilirubin 1.0 mg/dL      Globulin 2.5 gm/dL      A/G Ratio 1.6 g/dL      BUN/Creatinine Ratio 30.9     Anion Gap 11.0 mmol/L      eGFR 80.9 mL/min/1.73     Narrative:      GFR Normal >60  Chronic Kidney Disease <60  Kidney Failure <15    The GFR formula is only valid for adults with stable renal function between ages 18 and 70.    aPTT [[id number]]  (Abnormal) Collected: 11/09/23 1438    Specimen: Blood from Arm, Right Updated:  11/09/23 1513     PTT 23.4 seconds     Protime-INR [[id number]]  (Normal) Collected: 11/09/23 1438    Specimen: Blood from Arm, Right Updated: 11/09/23 1513     Protime 11.1 Seconds      INR 1.02    CBC Auto Differential [[id number]]  (Abnormal) Collected: 11/09/23 1438    Specimen: Blood Updated: 11/09/23 1502     WBC 15.10 10*3/mm3      RBC 4.07 10*6/mm3      Hemoglobin 13.4 g/dL      Hematocrit 39.5 %      MCV 97.0 fL      MCH 32.8 pg      MCHC 33.8 g/dL      RDW 13.0 %      RDW-SD 45.9 fl      MPV 7.6 fL      Platelets 219 10*3/mm3      Neutrophil % 91.1 %      Lymphocyte % 2.8 %      Monocyte % 6.1 %      Eosinophil % 0.0 %      Basophil % 0.0 %      Neutrophils, Absolute 13.70 10*3/mm3      Lymphocytes, Absolute 0.40 10*3/mm3      Monocytes, Absolute 0.90 10*3/mm3      Eosinophils, Absolute 0.00 10*3/mm3      Basophils, Absolute 0.00 10*3/mm3      nRBC 0.0 /100 WBC             Radiology images/reports reviewed  Minimally displaced fractures of pelvis and sacrum      Assessment: Minimally displaced fractures pelvic and sacrum    Plan:    Plan is to treat this conservatively.  We will put him on scheduled Celebrex and Tylenol to help with pain.  He does not want to take any narcotics if he does not need to.  Monitor for constipation.  Patient weightbearing as tolerated with physical therapy and assist.  Told to monitor for any neurologic changes  Patient will need home health physical therapy  Discussed this plan with Dr. Leong  Discharge Plan: Home with f/u in with Dr. Noonan in 2 weeks with x-ray  Date: 11/10/2023  Amrik Noonan MD

## 2023-11-10 NOTE — THERAPY EVALUATION
Patient Name: Anthony Lugo  : 1940    MRN: [id number]                              Today's Date: 11/10/2023       Admit Date: 2023    Visit Dx:     ICD-10-CM ICD-9-CM   1. 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    Hemorrhoids    History of colonic polyps    Hyperbilirubinemia    Hypokalemia    Knee pain, right    Liver nodule    Paroxysmal supraventricular tachycardia    Pinguecula    Red eye    Rupture of biceps tendon    Subarachnoid hemorrhage after traumatic injury without open intracranial wound, with prolonged loss of consciousness and return to pre-existing level of consciousness     Past Medical History:   Diagnosis Date    Hypertension     Mumps      Past Surgical History:   Procedure Laterality Date    COLONOSCOPY  2013    PROSTATECTOMY        General Information       Row Name 11/10/23 1447          OT Time and Intention    Document Type evaluation  -LS     Mode of Treatment occupational therapy  -       Row Name 11/10/23 1447          General Information    Patient Profile Reviewed yes  -LS     Prior Level of Function independent:;ADL's;driving;work;all household mobility  -LS     Existing Precautions/Restrictions other (see comments);fall  SBP<150  -LS     Barriers to Rehab none identified  -       Row Name 11/10/23 1447          Living Environment    People in Home spouse  -LS       Row Name 11/10/23 1447          Home Main Entrance    Number of Stairs, Main Entrance five  -LS       Row Name 11/10/23 1447          Stairs Within Home, Primary    Number of Stairs, Within Home, Primary none  -       Row Name 11/10/23  1447          Cognition    Orientation Status (Cognition) oriented x 4  -       Row Name 11/10/23 1447          Safety Issues, Functional Mobility    Impairments Affecting Function (Mobility) pain  -               User Key  (r) = Recorded By, (t) = Taken By, (c) = Cosigned By      Initials Name Provider Type    LS Jarvis Marlow OT Occupational Therapist                     Mobility/ADL's       Row Name 11/10/23 1448          Bed Mobility    Bed Mobility bed mobility (all) activities  -     All Activities, [place] (Bed Mobility) minimum assist (75% patient effort);moderate assist (50% patient effort);2 person assist  -     Assistive Device (Bed Mobility) draw sheet;head of bed elevated  -       Row Name 11/10/23 1448          Transfers    Transfers bed-chair transfer;sit-stand transfer  -       Row Name 11/10/23 1448          Bed-Chair Transfer    Bed-Chair [place] (Transfers) minimum assist (75% patient effort)  -     Assistive Device (Bed-Chair Transfers) walker, front-wheeled  -       Row Name 11/10/23 1448          Sit-Stand Transfer    Sit-Stand [place] (Transfers) minimum assist (75% patient effort)  -     Assistive Device (Sit-Stand Transfers) walker, front-wheeled  -       Row Name 11/10/23 1448          Functional Mobility    Functional Mobility- Ind. Level minimum assist (75% patient effort)  -     Functional Mobility- Device walker, front-wheeled  -       Row Name 11/10/23 1448          Activities of Daily Living    BADL Assessment/Intervention lower body dressing;toileting  -       Row Name 11/10/23 1448          Lower Body Dressing Assessment/Training    [place] Level (Lower Body Dressing) lower body dressing skills;maximum assist (25% patient effort)  -       Row Name 11/10/23 1448          Toileting Assessment/Training    [place] Level (Toileting) toileting skills;maximum assist (25% patient effort)  -               User Key  (r) = Recorded By,  (t) = Taken By, (c) = Cosigned By      Initials Name Provider Type    Jarvis Clemens OT Occupational Therapist                   Obj/Interventions       [facility] Name 11/10/23 1450          Sensory Assessment (Somatosensory)    Sensory Assessment (Somatosensory) sensation intact  -[facility] Name 11/10/23 1450          Range of Motion Comprehensive    General Range of Motion bilateral upper extremity ROM WFL  -[facility] Name 11/10/23 1450          Strength Comprehensive (MMT)    General Manual Muscle Testing (MMT) Assessment no strength deficits identified  -[facility] Name 11/10/23 1450          Balance    Balance Assessment sitting static balance;sitting dynamic balance;standing static balance;standing dynamic balance  -LS     Static Sitting Balance modified independence  -LS     Dynamic Sitting Balance modified independence  -LS     Position, Sitting Balance sitting edge of bed  -LS     Static Standing Balance contact guard  -LS     Dynamic Standing Balance minimal assist  -LS     Position/Device Used, Standing Balance walker, front-wheeled  -LS               User Key  (r) = Recorded By, (t) = Taken By, (c) = Cosigned By      Initials Name Provider Type    LS Jarvis Marlow OT Occupational Therapist                   Goals/Plan       Row Name 11/10/23 1456          Bed Mobility Goal 1 (OT)    Activity/Assistive Device (Bed Mobility Goal 1, OT) bed mobility activities, all  -LS     [place] Level/Cues Needed (Bed Mobility Goal 1, OT) standby assist  -LS     Time Frame (Bed Mobility Goal 1, OT) long term goal (LTG);2 weeks  -[facility] Name 11/10/23 1456          Transfer Goal 1 (OT)    Activity/Assistive Device (Transfer Goal 1, OT) transfers, all;walker, rolling  -LS     [place] Level/Cues Needed (Transfer Goal 1, OT) contact guard required  -LS     Time Frame (Transfer Goal 1, OT) long term goal (LTG);2 weeks  -[facility] Name 11/10/23 1456          Dressing Goal 1 (OT)    Activity/Device  (Dressing Goal 1, OT) dressing skills, all  -LS     [place]/Cues Needed (Dressing Goal 1, OT) contact guard required  -LS     Time Frame (Dressing Goal 1, OT) long term goal (LTG);2 weeks  -LS       Row Name 11/10/23 1456          Toileting Goal 1 (OT)    Activity/Device (Toileting Goal 1, OT) toileting skills, all  -LS     [place] Level/Cues Needed (Toileting Goal 1, OT) contact guard required  -LS     Time Frame (Toileting Goal 1, OT) long term goal (LTG);2 weeks  -LS       Row Name 11/10/23 1454          Therapy Assessment/Plan (OT)    Planned Therapy Interventions (OT) activity tolerance training;BADL retraining;functional balance retraining;IADL retraining;occupation/activity based interventions;ROM/therapeutic exercise;strengthening exercise;transfer/mobility retraining;patient/caregiver education/training;neuromuscular control/coordination retraining  -LS               User Key  (r) = Recorded By, (t) = Taken By, (c) = Cosigned By      Initials Name Provider Type    LS Jarvis Marlow, ADDIS Occupational Therapist                   Clinical Impression       Row Name 11/10/23 1452          Pain Assessment    Pretreatment Pain Rating 8/10  -LS     Posttreatment Pain Rating 10/10  -LS     Pain Location generalized  -LS     Pain Location - back;buttock  -LS       Row Name 11/10/23 1459          Plan of Care Review    Plan of Care Reviewed With patient;spouse  -LS     Outcome Evaluation Anthony Lugo is a 83 y.o. male that was direct admit from PCPs office.  Patient suffered a fall today off of an elevated surface landing backwards striking the back of his head. His PCP ordered imaging which demonstrated small amount of hyperintensities in the bifrontal region suggestive of subarachnoid hemorrhage.  Orthopedics also consulted for reported pelvic fracture. Imaging confirms SAH. Systolic BP must now remain below 150. Repeat CT shows SAH is stable, with no change from original scan. At baseline, pt is very  active living in [facility] wiht [facility]STE with his spouse and works full-time for his own company. Pt has supportive immediate and extended family that will be able to help when he goes back home. This date, pt reports 8/10 pain, but is able to participate in OT evaluation. Mod A needed for bod mobility. At EOB, Max A needed for lower body dressing. Min A required to come to standing and to transfer via use of RW. Max A required for toileting for hygiene. Pts family intends to take him home and assist as needed. OT feels during this time HHOT would be beneficial. Pt would be unable to tolerate inpatient rehab during the acute stages of healing secondary to pain levels. Recommend a RW and 3-in-1 combo chair upon dc to increase safety and assist with ADL care.  -       Row Name 11/10/23 6929          Therapy Assessment/Plan (OT)    Rehab Potential (OT) good, to achieve stated therapy goals  -     Criteria for Skilled Therapeutic Interventions Met (OT) yes;skilled treatment is necessary  -     Therapy Frequency (OT) 3 times/wk  -     Predicted Duration of Therapy Intervention (OT) until dc  -       Row Name 11/10/23 1906          Therapy Plan Review/Discharge Plan (OT)    Equipment Needs Upon Discharge (OT) walker, rolling;other (see comments)  3-in-1 combo chair  -     Anticipated Discharge Disposition (OT) home with 24/7 care;home with home health  -       Row Name 11/10/23 7961          Vital Signs    O2 Delivery Pre Treatment room air  -LS     O2 Delivery Intra Treatment room air  -LS     Post SpO2 (%) 99  -LS     O2 Delivery Post Treatment room air  -LS     Pre Patient Position Supine  -LS     Intra Patient Position Standing  -LS     Post Patient Position Sitting  -       Row Name 11/10/23 7594          Positioning and Restraints    Pre-Treatment Position in bed  -LS     Post Treatment Position bsc  -LS     In Chair notified nsg;sitting;call light within reach;encouraged to call for assist;with  family/caregiver;with other staff  -               User Key  (r) = Recorded By, (t) = Taken By, (c) = Cosigned By      Initials Name Provider Type    Jarvis Clemens, ADDIS Occupational Therapist                   Outcome Measures       Row Name 11/10/23 5277          How much help from another is currently needed...    Putting on and taking off regular lower body clothing? 2  -LS     Bathing (including washing, rinsing, and drying) 2  -LS     Toileting (which includes using toilet bed pan or urinal) 2  -LS     Putting on and taking off regular upper body clothing 2  -LS     Taking care of personal grooming (such as brushing teeth) 2  -LS     Eating meals 4  -LS     AM-PAC 6 Clicks Score (OT) 14  -LS       Row Name 11/10/23 1339 11/10/23 0801       How much help from another person do you currently need...    Turning from your back to your side while in flat bed without using bedrails? 3  -OD 2  -EV    Moving from lying on back to sitting on the side of a flat bed without bedrails? 3  -OD 2  -EV    Moving to and from a bed to a chair (including a wheelchair)? 3  -OD 2  -EV    Standing up from a chair using your arms (e.g., wheelchair, bedside chair)? 3  -OD 1  -EV    Climbing 3-5 steps with a railing? 2  -OD 1  -EV    To walk in hospital room? 3  -OD 1  -EV    AM-PAC 6 Clicks Score (PT) 17  -OD 9  -EV    Highest level of mobility 5 --> Static standing  -OD 3 --> Sat at edge of bed  -EV      Row Name 11/10/23 0400          How much help from another person do you currently need...    Turning from your back to your side while in flat bed without using bedrails? 2  -DI     Moving from lying on back to sitting on the side of a flat bed without bedrails? 2  -DI     Moving to and from a bed to a chair (including a wheelchair)? 2  -DI     Standing up from a chair using your arms (e.g., wheelchair, bedside chair)? 1  -DI     Climbing 3-5 steps with a railing? 1  -DI     To walk in hospital room? 1  -DI     AM-PAC 6 Clicks  Score (PT) 9  -DI     Highest level of mobility 3 --> Sat at edge of bed  -DI       Row Name 11/10/23 1457 11/10/23 1339       Functional Assessment    Outcome Measure Options AM-PAC 6 Clicks Daily Activity (OT)  -LS AM-PAC 6 Clicks Basic Mobility (PT)  -OD              User Key  (r) = Recorded By, (t) = Taken By, (c) = Cosigned By      Initials Name Provider Type    Nancy Cantu LPN Licensed Nurse    Jarvis Clemens OT Occupational Therapist    Velma Montero, RN Registered Nurse    Teresita Garcia, PT Physical Therapist                    Occupational Therapy Education       Title: PT OT SLP Therapies (Done)       Topic: Occupational Therapy (Done)       Point: ADL training (Done)       Description:   Instruct learner(s) on proper safety adaptation and remediation techniques during self care or transfers.   Instruct in proper use of assistive devices.                  Learning Progress Summary             Patient Acceptance, E,TB, VU by  at 11/10/2023 1457                                         User Key       Initials Effective Dates Name Provider Type Discipline     09/22/22 -  Jarvis Marlow OT Occupational Therapist OT                  OT Recommendation and Plan  Planned Therapy Interventions (OT): activity tolerance training, BADL retraining, functional balance retraining, IADL retraining, occupation/activity based interventions, ROM/therapeutic exercise, strengthening exercise, transfer/mobility retraining, patient/caregiver education/training, neuromuscular control/coordination retraining  Therapy Frequency (OT): 3 times/wk  Plan of Care Review  Plan of Care Reviewed With: patient, spouse  Outcome Evaluation: Anthony Lugo is a 83 y.o. male that was direct admit from PCPs office.  Patient suffered a fall today off of an elevated surface landing backwards striking the back of his head. His PCP ordered imaging which demonstrated small amount of hyperintensities in the bifrontal  region suggestive of subarachnoid hemorrhage.  Orthopedics also consulted for reported pelvic fracture. Imaging confirms SAH. Systolic BP must now remain below 150. Repeat CT shows SAH is stable, with no change from original scan. At baseline, pt is very active living in [facility] with his spouse and works full-time for his own company. Pt has supportive immediate and extended family that will be able to help when he goes back home. This date, pt reports 8/10 pain, but is able to participate in OT evaluation. Mod A needed for bod mobility. At EOB, Max A needed for lower body dressing. Min A required to come to standing and to transfer via use of RW. Max A required for toileting for hygiene. Pts family intends to take him home and assist as needed. OT feels during this time HHOT would be beneficial. Pt would be unable to tolerate inpatient rehab during the acute stages of healing secondary to pain levels. Recommend a RW and 3-in-1 combo chair upon dc to increase safety and assist with ADL care.     Time Calculation:         Time Calculation- OT       Row Name 11/10/23 1458             Time Calculation- OT    OT Start Time 1056  -      OT Stop Time 1133  -      OT Time Calculation (min) 37 min  -      Total Timed Code Minutes- OT 10 minute(s)  -      OT Received On 11/10/23  -      OT - Next Appointment 11/13/23  -      OT Goal Re-Cert Due Date 11/24/23  -         Timed Charges    84582 - OT Self Care/Mgmt Minutes 10  -LS         Untimed Charges    OT Eval/Re-eval Minutes 27  -LS         Total Minutes    Timed Charges Total Minutes 10  -LS      Untimed Charges Total Minutes 27  -LS       Total Minutes 37  -LS                User Key  (r) = Recorded By, (t) = Taken By, (c) = Cosigned By      Initials Name Provider Type    Jarvis Clemens OT Occupational Therapist                  Therapy Charges for Today       Code Description Service Date Service Provider Modifiers Qty    [id number]  OT SELF  CARE/MGMT/TRAIN EA 15 MIN 11/10/2023 Jarvis Marlow OT GO 1    [id number] HC OT EVAL MOD COMPLEXITY 4 11/10/2023 Jarvis Marlow OT GO 1                 Jarvis Marlow OT  11/10/2023

## 2023-11-10 NOTE — THERAPY EVALUATION
Patient Name: Anthony Lugo  : 1940    MRN: [id number]                              Today's Date: 11/10/2023       Admit Date: 2023    Visit Dx:     ICD-10-CM ICD-9-CM   1. 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    Hemorrhoids    History of colonic polyps    Hyperbilirubinemia    Hypokalemia    Knee pain, right    Liver nodule    Paroxysmal supraventricular tachycardia    Pinguecula    Red eye    Rupture of biceps tendon    Subarachnoid hemorrhage after traumatic injury without open intracranial wound, with prolonged loss of consciousness and return to pre-existing level of consciousness     Past Medical History:   Diagnosis Date    Hypertension     Mumps      Past Surgical History:   Procedure Laterality Date    COLONOSCOPY  2013    PROSTATECTOMY        General Information       Row Name 11/10/23 1332          Physical Therapy Time and Intention    Document Type evaluation  -OD     Mode of Treatment physical therapy  -OD       Row Name 11/10/23 1332          General Information    Patient Profile Reviewed yes  -OD     Prior Level of Function independent:;ADL's;driving;work;all household mobility;community mobility  -OD     Existing Precautions/Restrictions other (see comments);fall  SBP < 150  -OD     Barriers to Rehab none identified  -OD       Row Name 11/10/23 1332          Living Environment    People in Home spouse  -OD       Row Name 11/10/23 1332          Home Main Entrance    Number of Stairs, Main Entrance five  -OD       Row Name 11/10/23 1332          Stairs Within Home, Primary    Number of Stairs, Within Home, Primary none   -OD       Row Name 11/10/23 1332          Cognition    Orientation Status (Cognition) oriented x 4  -OD       Row Name 11/10/23 1332          Safety Issues, Functional Mobility    Impairments Affecting Function (Mobility) pain  -OD               User Key  (r) = Recorded By, (t) = Taken By, (c) = Cosigned By      Initials Name Provider Type    OD Teresita Johnson PT Physical Therapist                   Mobility       Row Name 11/10/23 1333          Bed Mobility    Bed Mobility bed mobility (all) activities  -OD     All Activities, [place] (Bed Mobility) minimum assist (75% patient effort)  -OD     Assistive Device (Bed Mobility) draw sheet  -OD       Row Name 11/10/23 1333          Bed-Chair Transfer    Bed-Chair [place] (Transfers) contact guard  -OD     Assistive Device (Bed-Chair Transfers) walker, front-wheeled  -OD       Row Name 11/10/23 1333          Sit-Stand Transfer    Sit-Stand [place] (Transfers) minimum assist (75% patient effort);contact guard  -OD     Assistive Device (Sit-Stand Transfers) walker, front-wheeled  -OD       Row Name 11/10/23 1333          Gait/Stairs (Locomotion)    [place] Level (Gait) contact guard  -OD     Assistive Device (Gait) walker, front-wheeled  -OD     Distance in Feet (Gait) 5 feet  -OD               User Key  (r) = Recorded By, (t) = Taken By, (c) = Cosigned By      Initials Name Provider Type    OD Teresita Johnson PT Physical Therapist                   Obj/Interventions       Row Name 11/10/23 1333          Range of Motion Comprehensive    General Range of Motion bilateral lower extremity ROM WFL  -OD       Row Name 11/10/23 1333          Strength Comprehensive (MMT)    General Manual Muscle Testing (MMT) Assessment no strength deficits identified  -OD     Comment, General Manual Muscle Testing (MMT) Assessment No formal MMT performed d/t significant pain s/p bilat pelvic frx  -OD       Row Name 11/10/23 1333          Balance    Balance Interventions  sitting;standing;minimal challenge  -OD       Row Name 11/10/23 1335          Sensory Assessment (Somatosensory)    Sensory Assessment (Somatosensory) sensation intact  -OD               User Key  (r) = Recorded By, (t) = Taken By, (c) = Cosigned By      Initials Name Provider Type    Teresita Garcia, PT Physical Therapist                   Goals/Plan       Row Name 11/10/23 1339          Bed Mobility Goal 1 (PT)    Activity/Assistive Device (Bed Mobility Goal 1, PT) bed mobility activities, all  -OD     Major Level/Cues Needed (Bed Mobility Goal 1, PT) independent  -OD     Time Frame (Bed Mobility Goal 1, PT) long term goal (LTG);2 weeks  -OD       Row Name 11/10/23 1339          Transfer Goal 1 (PT)    Activity/Assistive Device (Transfer Goal 1, PT) transfers, all;walker, rolling  -OD     Major Level/Cues Needed (Transfer Goal 1, PT) modified independence  -OD     Time Frame (Transfer Goal 1, PT) long term goal (LTG);2 weeks  -OD       Row Name 11/10/23 1335          Gait Training Goal 1 (PT)    Activity/Assistive Device (Gait Training Goal 1, PT) gait (walking locomotion);assistive device use;walker, rolling  -OD     Major Level (Gait Training Goal 1, PT) modified independence  -OD     Distance (Gait Training Goal 1, PT) 50 feet  -OD     Time Frame (Gait Training Goal 1, PT) long term goal (LTG);2 weeks  -OD       Row Name 11/10/23 1332          Stairs Goal 1 (PT)    Activity/Assistive Device (Stairs Goal 1, PT) stairs, all skills;ascending stairs;descending stairs  -OD     Major Level/Cues Needed (Stairs Goal 1, PT) modified independence  -OD     Number of Stairs (Stairs Goal 1, PT) 5  -OD     Time Frame (Stairs Goal 1, PT) long term goal (LTG);2 weeks  -OD       Row Name 11/10/23 1332          Therapy Assessment/Plan (PT)    Planned Therapy Interventions (PT) balance training;bed mobility training;gait training;home exercise program;neuromuscular re-education;ROM (range of  motion);postural re-education;transfer training;stair training;strengthening;stretching;patient/family education  -OD               User Key  (r) = Recorded By, (t) = Taken By, (c) = Cosigned By      Initials Name Provider Type    OD Teresita Johnson, PT Physical Therapist                   Clinical Impression       Row Name 11/10/23 1236          Pain    Pretreatment Pain Rating 8/10  -OD     Posttreatment Pain Rating 10/10  -OD     Pain Location generalized  -OD     Pain Location - back;buttock  -OD       Row Name 11/10/23 5077          Plan of Care Review    Plan of Care Reviewed With patient;spouse  -OD     Progress improving  -OD     Outcome Evaluation Pt is an 84 y/o M admitted to [facility] on 11/9/23 directly from Dr. Brian's office s/p fall at work. CT head (+) small subarachnoid hemorrhage bilat, R greater than L without midline shift. CT pelvis (+) medial R and L iliac fractures. Neurosurgery reports strict BP control maintain SBP < 150. At baseline, pt is very active living in [address] with his spouse and works full-time for his own company. Pt has supportive immediate and extended family that will be able to help when he goes back home. Edu pt regarding use of FWW for supporting pt when standing and ambulating for reducing pain and reducing weight-bearing BLE. Pt required Gene for supine>sit EOB, CGA-Gene for STS, and CGA for amb to bedside chair. Pt limited mostly d/t significant pain as related to pelvic fracture. Discussed d/c planning with patient, spouse, and patient's PCP. Pt will be safe to d/c home with family assist and would benefit from home health PT initially while pt heals and as his pain decreases, then he would benefit from OPPT for increased intensity. Pt will need RW and bedside commode upon d/c. Plan for PT to come saturday AM to work on stairs for improving safety when d/c home.  -OD       Row Name 11/10/23 3315          Therapy Assessment/Plan (PT)    Rehab Potential (PT) good, to  achieve stated therapy goals  -OD     Criteria for Skilled Interventions Met (PT) yes;meets criteria  -OD     Therapy Frequency (PT) 5 times/wk  -OD     Predicted Duration of Therapy Intervention (PT) until d/c  -OD       Row Name 11/10/23 1334          Vital Signs    O2 Delivery Pre Treatment room air  -OD     O2 Delivery Intra Treatment room air  -OD     O2 Delivery Post Treatment room air  -OD     Pre Patient Position Supine  -OD     Intra Patient Position Standing  -OD     Post Patient Position Sitting  -OD       Row Name 11/10/23 1334          Positioning and Restraints    Pre-Treatment Position in bed  -OD     Post Treatment Position chair  -OD     In Chair notified nsg;with family/caregiver;sitting;call light within reach;exit alarm on;encouraged to call for assist  -OD               User Key  (r) = Recorded By, (t) = Taken By, (c) = Cosigned By      Initials Name Provider Type    OD Teresita Johnson, PT Physical Therapist                   Outcome Measures       Row Name 11/10/23 1339 11/10/23 0801       How much help from another person do you currently need...    Turning from your back to your side while in flat bed without using bedrails? 3  -OD 2  -EV    Moving from lying on back to sitting on the side of a flat bed without bedrails? 3  -OD 2  -EV    Moving to and from a bed to a chair (including a wheelchair)? 3  -OD 2  -EV    Standing up from a chair using your arms (e.g., wheelchair, bedside chair)? 3  -OD 1  -EV    Climbing 3-5 steps with a railing? 2  -OD 1  -EV    To walk in hospital room? 3  -OD 1  -EV    AM-PAC 6 Clicks Score (PT) 17  -OD 9  -EV    Highest level of mobility 5 --> Static standing  -OD 3 --> Sat at edge of bed  -EV      Row Name 11/10/23 0400          How much help from another person do you currently need...    Turning from your back to your side while in flat bed without using bedrails? 2  -DI     Moving from lying on back to sitting on the side of a flat bed without bedrails? 2   -DI     Moving to and from a bed to a chair (including a wheelchair)? 2  -DI     Standing up from a chair using your arms (e.g., wheelchair, bedside chair)? 1  -DI     Climbing 3-5 steps with a railing? 1  -DI     To walk in hospital room? 1  -DI     AM-PAC 6 Clicks Score (PT) 9  -DI     Highest level of mobility 3 --> Sat at edge of bed  -DI       Row Name 11/10/23 1339          Functional Assessment    Outcome Measure Options AM-PAC 6 Clicks Basic Mobility (PT)  -OD               User Key  (r) = Recorded By, (t) = Taken By, (c) = Cosigned By      Initials Name Provider Type    Nancy Cantu LPN Licensed Nurse    Velma Montero RN Registered Nurse    Teresita Garcia, PT Physical Therapist                                 Physical Therapy Education       Title: PT OT SLP Therapies (Done)       Topic: Physical Therapy (Done)       Point: Mobility training (Done)       Learning Progress Summary             Patient Acceptance, E, VU by OD at 11/10/2023 1339                         Point: Home exercise program (Done)       Learning Progress Summary             Patient Acceptance, E, VU by OD at 11/10/2023 1339                         Point: Body mechanics (Done)       Learning Progress Summary             Patient Acceptance, E, VU by OD at 11/10/2023 1339                         Point: Precautions (Done)       Learning Progress Summary             Patient Acceptance, E, VU by OD at 11/10/2023 1339                                         User Key       Initials Effective Dates Name Provider Type Discipline    OD 05/11/23 -  Teresita Johnson, PT Physical Therapist PT                  PT Recommendation and Plan  Planned Therapy Interventions (PT): balance training, bed mobility training, gait training, home exercise program, neuromuscular re-education, ROM (range of motion), postural re-education, transfer training, stair training, strengthening, stretching, patient/family education  Plan of Care Reviewed  With: patient, spouse  Progress: improving  Outcome Evaluation: Pt is an 84 y/o M admitted to [facility] on 11/9/23 directly from Dr. Brian's office s/p fall at work. CT head (+) small subarachnoid hemorrhage bilat, R greater than L without midline shift. CT pelvis (+) medial R and L iliac fractures. Neurosurgery reports strict BP control maintain SBP < 150. At baseline, pt is very active living in [facility] with his spouse and works full-time for his own company. Pt has supportive immediate and extended family that will be able to help when he goes back home. Edu pt regarding use of FWW for supporting pt when standing and ambulating for reducing pain and reducing weight-bearing BLE. Pt required Gene for supine>sit EOB, CGA-Gene for STS, and CGA for amb to bedside chair. Pt limited mostly d/t significant pain as related to pelvic fracture. Discussed d/c planning with patient, spouse, and patient's PCP. Pt will be safe to d/c home with family assist and would benefit from home health PT initially while pt heals and as his pain decreases, then he would benefit from OPPT for increased intensity. Pt will need RW and bedside commode upon d/c. Plan for PT to come saturday AM to work on stairs for improving safety when d/c home.     Time Calculation:         PT Charges       Row Name 11/10/23 1340             Time Calculation    Start Time 1056  -OD      Stop Time 1130  -OD      Time Calculation (min) 34 min  -OD      PT Received On 11/10/23  -OD      PT - Next Appointment 11/11/23  -OD      PT Goal Re-Cert Due Date 11/24/23  -OD         Time Calculation- PT    Total Timed Code Minutes- PT 12 minute(s)  -OD                User Key  (r) = Recorded By, (t) = Taken By, (c) = Cosigned By      Initials Name Provider Type    OD Teresita Johnson PT Physical Therapist                  Therapy Charges for Today       Code Description Service Date Service Provider Modifiers Qty    27258780624 HC PT EVAL MOD COMPLEXITY 4 11/10/2023  Teresita Johnson, PT GP 1    [id number]  PT THERAPEUTIC ACT EA 15 MIN 11/10/2023 Teresita Johnson, PT GP 1            PT G-Codes  Outcome Measure Options: AM-PAC 6 Clicks Basic Mobility (PT)  AM-PAC 6 Clicks Score (PT): 17  PT Discharge Summary  Anticipated Discharge Disposition (PT): home with home health    Teresita Johnson, JOE  11/10/2023

## 2023-11-10 NOTE — CASE MANAGEMENT/SOCIAL WORK
Continued Stay Note   Malik     Patient Name: Anthony Lugo  MRN: [id number]  Today's Date: 11/10/2023    Admit Date: 11/9/2023    Plan: home with spouse; need RW and BSC from [facility]   Discharge Plan       Row Name 11/10/23 1600       Plan    Plan home with spouse; need RW and BSC from [facility]    Provided Post Acute Provider List? Yes    Post Acute Provider List Home Health    Plan Comments verified with spouse that need rw and bsc; orders place;  documentation needed sent to MD.  referral sent in [place]; refused rehab at this time.  wife spoke of finding Home PT.  CM deivered list and offered asssitance in setting up or making phone calls.  she denies assistance at this time.                      Expected Discharge Date and Time       Expected Discharge Date Expected Discharge Time    Nov 10, 2023               Brissa Diez RN

## 2023-11-10 NOTE — PLAN OF CARE
Problem: Adult Inpatient Plan of Care  Goal: Plan of Care Review  Outcome: Ongoing, Progressing  Goal: Patient-Specific Goal (Individualized)  Outcome: Ongoing, Progressing  Goal: Absence of Hospital-Acquired Illness or Injury  Outcome: Ongoing, Progressing  Intervention: Identify and Manage Fall Risk  Recent Flowsheet Documentation  Taken 11/10/2023 1145 by Gary, Nancy A, LPN  Safety Promotion/Fall Prevention:   activity supervised   assistive device/personal items within reach   clutter free environment maintained   fall prevention program maintained   nonskid shoes/slippers when out of bed   room organization consistent   safety round/check completed  Taken 11/10/2023 1000 by Gary, Nancy A, LPN  Safety Promotion/Fall Prevention:   activity supervised   assistive device/personal items within reach   clutter free environment maintained   fall prevention program maintained   nonskid shoes/slippers when out of bed   room organization consistent   safety round/check completed  Taken 11/10/2023 0801 by Gary, Nancy A, LPN  Safety Promotion/Fall Prevention:   activity supervised   assistive device/personal items within reach   clutter free environment maintained   fall prevention program maintained   nonskid shoes/slippers when out of bed   room organization consistent   safety round/check completed  Intervention: Prevent and Manage VTE (Venous Thromboembolism) Risk  Recent Flowsheet Documentation  Taken 11/10/2023 1145 by Nancy Yung LPN  VTE Prevention/Management:   bilateral   sequential compression devices off   patient refused intervention  Range of Motion: active ROM (range of motion) encouraged  Taken 11/10/2023 0801 by Nancy Yung LPN  VTE Prevention/Management:   bilateral   sequential compression devices off  Range of Motion: active ROM (range of motion) encouraged  Intervention: Prevent Infection  Recent Flowsheet Documentation  Taken 11/10/2023 1145 by Nancy Yung  LPN  Infection Prevention: hand hygiene promoted  Taken 11/10/2023 1000 by Nancy Yung LPN  Infection Prevention: hand hygiene promoted  Taken 11/10/2023 0801 by Nancy Yung LPN  Infection Prevention: hand hygiene promoted  Goal: Optimal Comfort and Wellbeing  Outcome: Ongoing, Progressing  Intervention: Provide Person-Centered Care  Recent Flowsheet Documentation  Taken 11/10/2023 1145 by Nancy Yung LPN  Trust Relationship/Rapport:   care explained   thoughts/feelings acknowledged  Taken 11/10/2023 0801 by Nancy Yung LPN  Trust Relationship/Rapport:   care explained   thoughts/feelings acknowledged  Goal: Readiness for Transition of Care  Outcome: Ongoing, Progressing     Problem: Asthma Comorbidity  Goal: Maintenance of Asthma Control  Outcome: Ongoing, Progressing  Intervention: Maintain Asthma Symptom Control  Recent Flowsheet Documentation  Taken 11/10/2023 1145 by Nancy Yung LPN  Medication Review/Management: medications reviewed  Taken 11/10/2023 1000 by Nancy Yung LPN  Medication Review/Management: medications reviewed  Taken 11/10/2023 0801 by Nancy Yung LPN  Medication Review/Management: medications reviewed     Problem: Behavioral Health Comorbidity  Goal: Maintenance of Behavioral Health Symptom Control  Outcome: Ongoing, Progressing  Intervention: Maintain Behavioral Health Symptom Control  Recent Flowsheet Documentation  Taken 11/10/2023 1145 by Nancy Yung LPN  Medication Review/Management: medications reviewed  Taken 11/10/2023 1000 by Nancy Yung LPN  Medication Review/Management: medications reviewed  Taken 11/10/2023 0801 by Nancy Yung LPN  Medication Review/Management: medications reviewed     Problem: COPD (Chronic Obstructive Pulmonary Disease) Comorbidity  Goal: Maintenance of COPD Symptom Control  Outcome: Ongoing, Progressing  Intervention: Maintain COPD-Symptom Control  Recent Flowsheet  Documentation  Taken 11/10/2023 1145 by Nancy Yung LPN  Medication Review/Management: medications reviewed  Taken 11/10/2023 1000 by Nancy Yung LPN  Medication Review/Management: medications reviewed  Taken 11/10/2023 0801 by Nancy Yung LPN  Supportive Measures: active listening utilized  Medication Review/Management: medications reviewed     Problem: Diabetes Comorbidity  Goal: Blood Glucose Level Within Targeted Range  Outcome: Ongoing, Progressing     Problem: Heart Failure Comorbidity  Goal: Maintenance of Heart Failure Symptom Control  Outcome: Ongoing, Progressing  Intervention: Maintain Heart Failure-Management  Recent Flowsheet Documentation  Taken 11/10/2023 1145 by Nancy Yung LPN  Medication Review/Management: medications reviewed  Taken 11/10/2023 1000 by Nancy Yung LPN  Medication Review/Management: medications reviewed  Taken 11/10/2023 0801 by Nancy Yung LPN  Medication Review/Management: medications reviewed     Problem: Hypertension Comorbidity  Goal: Blood Pressure in Desired Range  Outcome: Ongoing, Progressing  Intervention: Maintain Blood Pressure Management  Recent Flowsheet Documentation  Taken 11/10/2023 1145 by Nancy Yung LPN  Medication Review/Management: medications reviewed  Taken 11/10/2023 1000 by Nancy Yung LPN  Medication Review/Management: medications reviewed  Taken 11/10/2023 0801 by Nancy Yung LPN  Medication Review/Management: medications reviewed     Problem: Obstructive Sleep Apnea Risk or Actual Comorbidity Management  Goal: Unobstructed Breathing During Sleep  Outcome: Ongoing, Progressing     Problem: Osteoarthritis Comorbidity  Goal: Maintenance of Osteoarthritis Symptom Control  Outcome: Ongoing, Progressing  Intervention: Maintain Osteoarthritis Symptom Control  Recent Flowsheet Documentation  Taken 11/10/2023 1145 by Nancy Yung LPN  Medication Review/Management: medications  reviewed  Taken 11/10/2023 1000 by Nancy Yung LPN  Medication Review/Management: medications reviewed  Taken 11/10/2023 0801 by Nancy Yung LPN  Medication Review/Management: medications reviewed     Problem: Pain Chronic (Persistent) (Comorbidity Management)  Goal: Acceptable Pain Control and Functional Ability  Outcome: Ongoing, Progressing  Intervention: Manage Persistent Pain  Recent Flowsheet Documentation  Taken 11/10/2023 1145 by Nancy Yung LPN  Medication Review/Management: medications reviewed  Taken 11/10/2023 1000 by Nancy Yung LPN  Medication Review/Management: medications reviewed  Taken 11/10/2023 0801 by Nancy Yung LPN  Medication Review/Management: medications reviewed  Intervention: Optimize Psychosocial Wellbeing  Recent Flowsheet Documentation  Taken 11/10/2023 0801 by Nancy Yung LPN  Supportive Measures: active listening utilized  Diversional Activities: television  Family/Support System Care: self-care encouraged     Problem: Seizure Disorder Comorbidity  Goal: Maintenance of Seizure Control  Outcome: Ongoing, Progressing     Problem: Skin Injury Risk Increased  Goal: Skin Health and Integrity  Outcome: Ongoing, Progressing     Problem: Pain Acute  Goal: Acceptable Pain Control and Functional Ability  Outcome: Ongoing, Progressing  Intervention: Prevent or Manage Pain  Recent Flowsheet Documentation  Taken 11/10/2023 1145 by Nancy Yung LPN  Medication Review/Management: medications reviewed  Taken 11/10/2023 1000 by Nancy Yung LPN  Medication Review/Management: medications reviewed  Taken 11/10/2023 0801 by Nancy Yung LPN  Medication Review/Management: medications reviewed  Intervention: Optimize Psychosocial Wellbeing  Recent Flowsheet Documentation  Taken 11/10/2023 0801 by Nancy Yung LPN  Supportive Measures: active listening utilized  Diversional Activities: television     Problem: Self-Care  Deficit  Goal: Improved Ability to Complete Activities of Daily Living  Outcome: Ongoing, Progressing   Goal Outcome Evaluation:

## 2023-11-11 ENCOUNTER — HOME HEALTH ADMISSION (OUTPATIENT)
Dept: HOME HEALTH SERVICES | Facility: HOME HEALTHCARE | Age: 83
End: 2023-11-11
Payer: MEDICARE

## 2023-11-11 ENCOUNTER — READMISSION MANAGEMENT (OUTPATIENT)
Dept: CALL CENTER | Facility: HOSPITAL | Age: 83
End: 2023-11-11
Payer: MEDICARE

## 2023-11-11 VITALS
OXYGEN SATURATION: 95 % | BODY MASS INDEX: 23.39 KG/M2 | DIASTOLIC BLOOD PRESSURE: 71 MMHG | SYSTOLIC BLOOD PRESSURE: 140 MMHG | HEIGHT: 67 IN | TEMPERATURE: 97.8 F | WEIGHT: 149.03 LBS | HEART RATE: 65 BPM | RESPIRATION RATE: 18 BRPM

## 2023-11-11 PROCEDURE — G0378 HOSPITAL OBSERVATION PER HR: HCPCS

## 2023-11-11 PROCEDURE — 97116 GAIT TRAINING THERAPY: CPT

## 2023-11-11 PROCEDURE — 97530 THERAPEUTIC ACTIVITIES: CPT

## 2023-11-11 RX ORDER — POLYETHYLENE GLYCOL 3350 17 G/17G
17 POWDER, FOR SOLUTION ORAL DAILY PRN
Qty: 30 PACKET | Refills: 0 | Status: SHIPPED | OUTPATIENT
Start: 2023-11-11 | End: 2023-12-11

## 2023-11-11 RX ORDER — PHENOL 1.4 %
600 AEROSOL, SPRAY (ML) MUCOUS MEMBRANE DAILY
Qty: 90 TABLET | Refills: 1 | Status: SHIPPED | OUTPATIENT
Start: 2023-11-11

## 2023-11-11 RX ORDER — ACETAMINOPHEN 500 MG
1000 TABLET ORAL 2 TIMES DAILY
Qty: 120 TABLET | Refills: 0 | Status: SHIPPED | OUTPATIENT
Start: 2023-11-11 | End: 2023-12-11

## 2023-11-11 RX ORDER — CELECOXIB 200 MG/1
200 CAPSULE ORAL EVERY 12 HOURS SCHEDULED
Qty: 60 CAPSULE | Refills: 2 | Status: SHIPPED | OUTPATIENT
Start: 2023-11-11

## 2023-11-11 RX ORDER — HYDRALAZINE HYDROCHLORIDE 25 MG/1
25 TABLET, FILM COATED ORAL EVERY 12 HOURS SCHEDULED
Qty: 180 TABLET | Refills: 0 | Status: SHIPPED | OUTPATIENT
Start: 2023-11-11 | End: 2024-02-09

## 2023-11-11 RX ORDER — AMOXICILLIN 250 MG
2 CAPSULE ORAL 2 TIMES DAILY
Qty: 40 TABLET | Refills: 0 | Status: SHIPPED | OUTPATIENT
Start: 2023-11-11 | End: 2023-11-21

## 2023-11-11 RX ORDER — TELMISARTAN AND HYDROCHLORTHIAZIDE 80; 12.5 MG/1; MG/1
1 TABLET ORAL DAILY
Qty: 90 TABLET | Refills: 1 | Status: SHIPPED | OUTPATIENT
Start: 2023-11-11

## 2023-11-11 RX ADMIN — ACETAMINOPHEN 1000 MG: 500 TABLET, FILM COATED ORAL at 08:37

## 2023-11-11 RX ADMIN — Medication 10 ML: at 08:37

## 2023-11-11 RX ADMIN — PANTOPRAZOLE SODIUM 40 MG: 40 TABLET, DELAYED RELEASE ORAL at 08:37

## 2023-11-11 RX ADMIN — LOSARTAN POTASSIUM 100 MG: 50 TABLET, FILM COATED ORAL at 08:37

## 2023-11-11 RX ADMIN — DOCUSATE SODIUM 50MG AND SENNOSIDES 8.6MG 2 TABLET: 8.6; 5 TABLET, FILM COATED ORAL at 08:37

## 2023-11-11 RX ADMIN — LUBIPROSTONE 24 MCG: 24 CAPSULE, GELATIN COATED ORAL at 08:37

## 2023-11-11 RX ADMIN — CELECOXIB 200 MG: 200 CAPSULE ORAL at 08:37

## 2023-11-11 RX ADMIN — HYDRALAZINE HYDROCHLORIDE 25 MG: 25 TABLET, FILM COATED ORAL at 08:37

## 2023-11-11 RX ADMIN — Medication 1000 UNITS: at 08:37

## 2023-11-11 NOTE — CASE MANAGEMENT/SOCIAL WORK
Case Management Discharge Note      Final Note: home with [facility].    Provided Post Acute Provider List?: Yes  Post Acute Provider List: Home Health    Selected Continued Care - Discharged on 11/11/2023 Admission date: 11/9/2023 - Discharge disposition: Home or Self Care            Durable Medical Equipment Coordination complete.      Service Provider Selected Services Address Phone Fax Patient Preferred    [facility] [address], [place] [facility] [address] [phone] --                    Home Medical Care Coordination complete.      Service Provider Selected Services Address Phone Fax Patient Preferred    [facility] [address] [address] [phone] --                 Transportation Services  Private: Car    Final Discharge Disposition Code: 06 - home with home health care

## 2023-11-11 NOTE — PLAN OF CARE
Assessment: Anthony Lugo presents with functional mobility impairments which indicate the need for skilled intervention. Tolerating session today without incident. Patient a little delayed in processing instructions. Req constant vc's for proper  hand placement. Able to go up/down 5 stairs with 1  HR and HHA on opposite site with step to gait and no incr in pain. Will need rwx and 3n1 commode at dc.Will continue to follow and progress as tolerated.

## 2023-11-11 NOTE — DISCHARGE PLACEMENT REQUEST
"Anthony Lugo (83 y.o. Male)       Date of Birth   1940    Social Security Number       Address   [address] [place] [address]    Home Phone   [phone]    MRN   [id number]       M[employer]on   Faith    Marital Status                               Admission Date   11/9/23    Admission Type   Elective    Admitting Provider   Andres Leong MD    Attending Provider   Andres Leong MD    Department, Room/Bed   [facility], 242/1       Discharge Date       Discharge Disposition       Discharge Destination                                 Attending Provider: Andres Leong MD    Allergies: No Known Allergies    Isolation: None   Infection: None   Code Status: CPR    Ht: 170.2 cm (67\")   Wt: 67.6 kg (149 lb 0.5 oz)    Admission Cmt: None   Principal Problem: Subarachnoid hemorrhage after traumatic injury without open intracranial wound, with prolonged loss of consciousness and return to pre-existing level of consciousness [S06.6X9A]                   Active Insurance as of 11/9/2023       Primary Coverage       Payor Plan Insurance Group Employer/Plan Group    MEDICARE MEDICARE A & B        Payor Plan Address Payor Plan Phone Number Payor Plan Fax Number Effective Dates    [address] [phone]  4/1/2006 - None Entered    [facility] [id number]         Subscriber Name Subscriber Birth Date Member ID       ANTHONY LUGO 1940 [id number]               Secondary Coverage       Payor Plan Insurance Group Employer/Plan Group    [employer]           PLAN G       Payor Plan Address Payor Plan Phone Number Payor Plan Fax Number Effective Dates    [address]   12/1/2017 - None Entered    [place] [id number]         Subscriber Name Subscriber Birth Date Member ID       ANTHONY LUGO 1940 [id number]                     Emergency Contacts        (Rel.) Home Phone Work Phone Mobile Phone    " "charity pérez (Spouse) [phone]    Dilip Pérez (Son) [phone] -- [phone]                 History & Physical        Amrik Noonan MD at 11/10/23 1613             History & Physical       Patient: Anthony Pérez    Proposed Surgery and date:      YOB: 1940    Medical Record Number: [id number]  Wt Readings from Last 3 Encounters:   11/10/23 70.6 kg (155 lb 10.3 oz)   06/20/22 69.4 kg (153 lb)   03/28/22 70 kg (154 lb 6.4 oz)     Ht Readings from Last 3 Encounters:   11/09/23 170.2 cm (67\")   06/20/22 172 cm (67.72\")   03/28/22 172 cm (67.72\")     Body mass index is 24.38 kg/m².  Facility age limit for growth %chava is 20 years.      Surgeon:  Dr. Amrik Noonan M.D.        Chief Complaints: Pain    History of Present Illness: 83 y.o. male presents with pelvic and sacral fractures status post fall.   Patient slipped off table while working yesterday.  Was able to ambulate following the accident.  Denies any numbness tingling in the legs or feet  Allergies: No Known Allergies    Medications:   Home Medications:  No current facility-administered medications on file prior to encounter.     Current Outpatient Medications on File Prior to Encounter   Medication Sig    calcium carbonate (OS-CHRISTIAN) 600 MG tablet Take 1 tablet by mouth Daily.    Cholecalciferol (VITAMIN D-3) 1000 units capsule 1,000 Units Daily.    polyethyl glycol-propyl glycol (SYSTANE) 0.4-0.3 % solution ophthalmic solution (artificial tears) Administer 2 drops to both eyes Daily.    telmisartan-hydrochlorothiazide (MICARDIS HCT) 80-12.5 MG per tablet Take 1 tablet by mouth Daily.     Current Medications:  Scheduled Meds:celecoxib, 200 mg, Oral, Q12H  cholecalciferol, 1,000 Units, Oral, Daily  hydrALAZINE, 25 mg, Oral, Q12H  losartan, 100 mg, Oral, Q24H  lubiprostone, 24 mcg, Oral, BID With Meals  melatonin, 10 mg, Oral, Nightly  pantoprazole, 40 mg, Oral, QAM AC  prednisoLONE acetate, 1 drop, Both " Eyes, Daily  senna-docusate sodium, 2 tablet, Oral, BID  sodium chloride, 10 mL, Intravenous, Q12H      Continuous Infusions:   PRN Meds:.  acetaminophen    senna-docusate sodium **AND** polyethylene glycol **AND** bisacodyl **AND** bisacodyl    hydrALAZINE    HYDROmorphone    oxyCODONE    sodium chloride    sodium chloride    Past Medical History:   Diagnosis Date    Hypertension     Mumps         Past Surgical History:   Procedure Laterality Date    COLONOSCOPY  2013    PROSTATECTOMY  2001        Social History     Occupational History    Occupation: "[employer]   Tobacco Use    Smoking status: Never    Smokeless tobacco: Never   Vaping Use    Vaping Use: Never used   Substance and Sexual Activity    Alcohol use: Yes     Comment: Social    Drug use: No    Sexual activity: Defer      Social History     Social History Narrative    Not on file        Family History   Problem Relation Age of Onset    Asthma Other          Review of Systems: 14 point review of systems was performed and was negative except as documented in the history of present illness.      Physical Exam: 83 y.o. male    Vital signs reviewed.    General Appearance:    Alert, cooperative, in no acute distress                  General: alert, oriented  Eyes: conjunctiva clear  ENT: external ears and nose atraumatic  CV: no peripheral edema  Resp: normal respiratory effort  Skin: no rashes or wounds; normal turgor  Psych: mood and affect appropriate  Lymph: no nodes appreciated  Neuro: gross sensation intact  Vascular:  Palpable peripheral pulse in noted extremity  Musculoskeletal Extremities: 5 out of 5 strength throughout the lower extremities.  No instability with palpation of the pelvis            Diagnostic Tests:  Lab Results (last 7 days)       Procedure Component Value Units Date/Time    Basic Metabolic Panel [[id number]]  (Abnormal) Collected: 11/10/23 0143    Specimen: Blood Updated: 11/10/23 0255     Glucose 129 mg/dL      BUN 22 mg/dL       Creatinine 0.90 mg/dL      Sodium 138 mmol/L      Potassium 3.6 mmol/L      Chloride 102 mmol/L      CO2 25.0 mmol/L      Calcium 9.0 mg/dL      BUN/Creatinine Ratio 24.4     Anion Gap 11.0 mmol/L      eGFR 85.3 mL/min/1.73     Narrative:      GFR Normal >60  Chronic Kidney Disease <60  Kidney Failure <15    The GFR formula is only valid for adults with stable renal function between ages 18 and 70.    CBC (No Diff) [[id number]]  (Abnormal) Collected: 11/10/23 0143    Specimen: Blood Updated: 11/10/23 0227     WBC 8.20 10*3/mm3      RBC 3.73 10*6/mm3      Hemoglobin 12.3 g/dL      Hematocrit 36.1 %      MCV 96.7 fL      MCH 32.9 pg      MCHC 34.1 g/dL      RDW 13.2 %      RDW-SD 46.8 fl      MPV 7.8 fL      Platelets 203 10*3/mm3     Urinalysis With Microscopic If Indicated (No Culture) - Urine, Clean Catch [[id number]]  (Normal) Collected: 11/09/23 1743    Specimen: Urine, Clean Catch Updated: 11/09/23 1808     Color, UA Yellow     Appearance, UA Clear     pH, UA 6.0     Specific Gravity, UA 1.026     Glucose, UA Negative     Ketones, UA Negative     Bilirubin, UA Negative     Blood, UA Negative     Protein, UA Negative     Leuk Esterase, UA Negative     Nitrite, UA Negative     Urobilinogen, UA 0.2 E.U./dL    Narrative:      Urine microscopic not indicated.    Comprehensive Metabolic Panel [[id number]]  (Abnormal) Collected: 11/09/23 1438    Specimen: Blood Updated: 11/09/23 1517     Glucose 165 mg/dL      BUN 29 mg/dL      Creatinine 0.94 mg/dL      Sodium 138 mmol/L      Potassium 4.6 mmol/L      Chloride 102 mmol/L      CO2 25.0 mmol/L      Calcium 9.7 mg/dL      Total Protein 6.4 g/dL      Albumin 3.9 g/dL      ALT (SGPT) 30 U/L      AST (SGOT) 38 U/L      Alkaline Phosphatase 54 U/L      Total Bilirubin 1.0 mg/dL      Globulin 2.5 gm/dL      A/G Ratio 1.6 g/dL      BUN/Creatinine Ratio 30.9     Anion Gap 11.0 mmol/L      eGFR 80.9 mL/min/1.73     Narrative:      GFR Normal >60  Chronic Kidney Disease <60  Kidney  Failure <15    The GFR formula is only valid for adults with stable renal function between ages 18 and 70.    aPTT [[id number]]  (Abnormal) Collected: 11/09/23 1438    Specimen: Blood from Arm, Right Updated: 11/09/23 1513     PTT 23.4 seconds     Protime-INR [[id number]]  (Normal) Collected: 11/09/23 1438    Specimen: Blood from Arm, Right Updated: 11/09/23 1513     Protime 11.1 Seconds      INR 1.02    CBC Auto Differential [[id number]]  (Abnormal) Collected: 11/09/23 1438    Specimen: Blood Updated: 11/09/23 1502     WBC 15.10 10*3/mm3      RBC 4.07 10*6/mm3      Hemoglobin 13.4 g/dL      Hematocrit 39.5 %      MCV 97.0 fL      MCH 32.8 pg      MCHC 33.8 g/dL      RDW 13.0 %      RDW-SD 45.9 fl      MPV 7.6 fL      Platelets 219 10*3/mm3      Neutrophil % 91.1 %      Lymphocyte % 2.8 %      Monocyte % 6.1 %      Eosinophil % 0.0 %      Basophil % 0.0 %      Neutrophils, Absolute 13.70 10*3/mm3      Lymphocytes, Absolute 0.40 10*3/mm3      Monocytes, Absolute 0.90 10*3/mm3      Eosinophils, Absolute 0.00 10*3/mm3      Basophils, Absolute 0.00 10*3/mm3      nRBC 0.0 /100 WBC             Radiology images/reports reviewed  Minimally displaced fractures of pelvis and sacrum      Assessment: Minimally displaced fractures pelvic and sacrum    Plan:    Plan is to treat this conservatively.  We will put him on scheduled Celebrex and Tylenol to help with pain.  He does not want to take any narcotics if he does not need to.  Monitor for constipation.  Patient weightbearing as tolerated with physical therapy and assist.  Told to monitor for any neurologic changes  Patient will need home health physical therapy  Discussed this plan with Dr. Leong  Discharge Plan: Home with f/u in with Dr. Noonan in 2 weeks with x-ray  Date: 11/10/2023  Amrik Noonan MD     Electronically signed by Amrik Noonan MD at 11/10/23 2886       Andres Leong MD at 11/09/23 1608                  History and Physical      Name:  Anthony Lugo ADMIT: 2023   : 1940  PROVIDER: Andres Leong MD    MRN: [id number] LOS: 1 days   AGE/SEX: 83 y.o. male  ROOM: [facility]/     Date of Service: 2023                        Chief Complaint:       Fell at work with blunt trauma to back of head and pelvis.  CT positive for subarachnoid hemorrhage and bilateral pelvic fracture.    History of Present Illness:       Amador Lugo is a very pleasant and generally functionally active 83-year-old WM well-known to me with HPTN, remote prostate CA, HLD, and moderate bilateral sensorineural hearing loss who presented to my office today after falling backwards off of a 40 inch table onto concrete producing occipital laceration and severe right hip pain.  In the office, he was found to have right periorbital ecchymoses, and approximately 3.5 cm irregular occipital scalp laceration/abrasion and severe tenderness over his right iliac crest and pelvis with soft tissue ecchymoses and a very antalgic gait.  After cleaning up the wounds his scalp laceration was sutured and he was sent directly to CT scan for stat head and pelvic CT imaging.  CT images revealed a small subarachnoid hemorrhage with bilateral pelvic fractures, nondisplaced.  He was subsequently made direct admission for further evaluation and treatment of acute head and pelvic trauma.    Review of Systems:         Limited ROS history available due to acute pain pain from his earlier fall.  He denied headache visual changes or any change in his hearing or swallowing.  He denies chest pain or palpitations and has no difficulty with shortness of air.  No abdominal pain.  Denies incontinence.  No specific musculoskeletal deformity or loss of sensorimotor function despite very antalgic gait with limited weightbearing.    Past medical history, surgical history, social history, family history, allergies and all medications reviewed and agreed.      Past History/Allergies?Social History:      Past Medical History:   Diagnosis Date    Hypertension     Mumps          Past Surgical History :     Past Surgical History:   Procedure Laterality Date    COLONOSCOPY  2013    PROSTATECTOMY  2001          Family History:     Family History   Problem Relation Age of Onset    Asthma Other           Social History:     Social History     Tobacco Use    Smoking status: Never    Smokeless tobacco: Never   Substance Use Topics    Alcohol use: Yes     Comment: Social          Allergies:     No Known Allergies      Home meds:     Medications Prior to Admission   Medication Sig Dispense Refill Last Dose    aspirin 81 MG EC tablet Take 1 tablet by mouth Daily.   11/9/2023    azelastine (ASTELIN) 0.1 % nasal spray 1 spray into the nostril(s) as directed by provider 2 (Two) Times a Day.   11/9/2023    calcium carbonate (OS-CHRISTIAN) 600 MG tablet Take 1 tablet by mouth Daily.   11/9/2023    Cholecalciferol (VITAMIN D-3) 1000 units capsule 1,000 Units Daily.   11/9/2023    Co-Enzyme Q-10 100 MG capsule Take 1 capsule by mouth Daily.   11/9/2023    Multiple Vitamins-Minerals (CENTRUM SILVER ADULT 50+) tablet CENTRUM SILVER ADULT 50+ TABS   11/9/2023    Omega-3 Fatty Acids (FISH OIL) 1000 MG capsule delayed-release FISH OIL 1000 MG CPDR   11/9/2023    Specialty Vitamins Products (HEALTHY HEART COMPLEX) tablet HEALTHY HEART COMPLEX TABS   11/9/2023    telmisartan-hydrochlorothiazide (MICARDIS HCT) 80-12.5 MG per tablet Take 1 tablet by mouth Daily.  2 11/9/2023    cefdinir (OMNICEF) 300 MG capsule Take 1 capsule by mouth 2 (Two) Times a Day. 20 capsule 0     prednisoLONE acetate (PRED FORTE) 1 % ophthalmic suspension Apply 1 % to eye(s) as directed by provider Daily.  0     tacrolimus (PROTOPIC) 0.1 % ointment Apply  topically to the appropriate area as directed.            Scheduled meds:   losartan, 100 mg, Oral, Q24H  lubiprostone, 24 mcg, Oral, BID With Meals  senna-docusate sodium, 2 tablet, Oral, BID  sodium chloride, 10 mL,  "Intravenous, Q12H          Objectives:     tMax 24 hrs: Temp (24hrs), Av.6 °F (36.4 °C), Min:97.6 °F (36.4 °C), Max:97.6 °F (36.4 °C)      Vitals Ranges:   Temp:  [97.6 °F (36.4 °C)] 97.6 °F (36.4 °C)  Heart Rate:  [61-68] 61  Resp:  [19-22] 19  BP: (190-206)/(84-91) 190/84    Intake and Output Last 3 Shifts:   No intake/output data recorded.      Exam:     BP (!) 190/84   Pulse 61   Temp 97.6 °F (36.4 °C) (Oral)   Resp 19   Ht 170.2 cm (67\")   Wt 65.8 kg (145 lb)   SpO2 97%   BMI 22.71 kg/m²     HEENT: Irregular triquetral scalp fracture/laceration of the occipital region approximately 3.5 cm-now sutured.  Right periorbital ecchymoses.  PERRL; EOMI, sclera clear; nostrils and oropharynx clear with dentition intact; tongue midline.  Neck: Supple without adenopathy; no JVD or bruits  Lungs: CTA with adequate global airflow and no specific adventitious sounds  Heart: RRR without significant murmur  Abd: Soft, NT, ND, BS positive, no hepatosplenomegaly  Ext: Decreased range of motion in both lower extremities with very antalgic gait extreme tenderness of the pelvis and iliac crest bilaterally  Neuro: CN grossly intact, no focal deficits  Skin: Periorbital ecchymoses with minor abrasion and significant occipital scalp laceration described above        Data Review:       Lab Results (last 24 hours)       Procedure Component Value Units Date/Time    Comprehensive Metabolic Panel [[id number]]  (Abnormal) Collected: 23 1438    Specimen: Blood Updated: 23 1517     Glucose 165 mg/dL      BUN 29 mg/dL      Creatinine 0.94 mg/dL      Sodium 138 mmol/L      Potassium 4.6 mmol/L      Chloride 102 mmol/L      CO2 25.0 mmol/L      Calcium 9.7 mg/dL      Total Protein 6.4 g/dL      Albumin 3.9 g/dL      ALT (SGPT) 30 U/L      AST (SGOT) 38 U/L      Alkaline Phosphatase 54 U/L      Total Bilirubin 1.0 mg/dL      Globulin 2.5 gm/dL      A/G Ratio 1.6 g/dL      BUN/Creatinine Ratio 30.9     Anion Gap 11.0 mmol/L  "     eGFR 80.9 mL/min/1.73     Narrative:      GFR Normal >60  Chronic Kidney Disease <60  Kidney Failure <15    The GFR formula is only valid for adults with stable renal function between ages 18 and 70.    aPTT [[id number]]  (Abnormal) Collected: 11/09/23 1438    Specimen: Blood from Arm, Right Updated: 11/09/23 1513     PTT 23.4 seconds     Protime-INR [[id number]]  (Normal) Collected: 11/09/23 1438    Specimen: Blood from Arm, Right Updated: 11/09/23 1513     Protime 11.1 Seconds      INR 1.02    CBC Auto Differential [[id number]]  (Abnormal) Collected: 11/09/23 1438    Specimen: Blood Updated: 11/09/23 1502     WBC 15.10 10*3/mm3      RBC 4.07 10*6/mm3      Hemoglobin 13.4 g/dL      Hematocrit 39.5 %      MCV 97.0 fL      MCH 32.8 pg      MCHC 33.8 g/dL      RDW 13.0 %      RDW-SD 45.9 fl      MPV 7.6 fL      Platelets 219 10*3/mm3      Neutrophil % 91.1 %      Lymphocyte % 2.8 %      Monocyte % 6.1 %      Eosinophil % 0.0 %      Basophil % 0.0 %      Neutrophils, Absolute 13.70 10*3/mm3      Lymphocytes, Absolute 0.40 10*3/mm3      Monocytes, Absolute 0.90 10*3/mm3      Eosinophils, Absolute 0.00 10*3/mm3      Basophils, Absolute 0.00 10*3/mm3      nRBC 0.0 /100 WBC                   Imaging:      Imaging Results (Last 24 Hours)       ** No results found for the last 24 hours. **          No orders to display   C      Assessment:      Principal Problem:    Subarachnoid hemorrhage after traumatic injury without open intracranial wound, with prolonged loss of consciousness and return to pre-existing level of consciousness      1.  83-year-old WM with subarachnoid hemorrhage and scalp laceration secondary to fall with blunt trauma    2.  Nondisplaced bilateral pelvic fracture-secondary to fall    3.  Essential hypertension-mildly accelerated on admission; generally well regulated on telmisartan with HCTZ    4 mild hyperlipidemia-not on statin therapy    5.  Moderately severe bilateral sensorineural hearing loss with  bilateral hearing aids    6.  History of osteopenia-maintained on calcium and vitamin D supplements    Plan:     Admit to general medical [facility]ard for close observation with neurochecks every 2 hours and follow-up head CT in approximately 6 hours   Vigilant blood pressure regulation; maintain systolic blood pressure below 150 mmHg  Neurosurgery consult  Orthopedic consult  Analgesia, DVT prophylaxis, GI prophylaxis and additional supportive care as indicated     Clinical condition and further diagnostic and therapeutic plans discussed with patient, wife at bedside as well as bedside nurse.  Further recommendations pending clinical course        Andres Leong MD  [facility]  2023  16:06 EST    Electronically signed by Andres Lenog MD at 23 1637          Physician Progress Notes (last 24 hours)        Andres Leong MD at 11/10/23 1408                PROGRESS NOTE      Name: Anthony Lugo ADMIT: 2023   : 1940  PROVIDER: Andres Leong MD    MRN: [id number] LOS: 1 days   AGE/SEX: 83 y.o. male  ROOM: [facility]/     Date of Service: 11/10/2023                           CHIEF COMPLIANTS / REASON FOR FOLLOW UP        Subarachnoid hemorrhage and bilateral pelvic fracture secondary to fall 2023    Subjective:      Very sore low back and pelvis, though able to stand and ambulate with assistance.  Minimal headache without visual changes, dizziness, nausea/vomiting or any focal/lateralizing sensorimotor deficits.  Overall, looks good in view of significant fall injury.  Patient's wife, Marzena present in room throughout my exam/visit today.       Review of Systems:       Constitutional: Very sore and slow-moving as expected with no other significant constitutional complaints  HEENT: No significant headache, no epistaxis, no difficulty swallowing or change in hearing  Respiratory: No shortness of air or cough no mild soreness/pain on right-side  "chest with deep inspiratory effort  Cardiac: No chest pain palpitations or CHF symptoms  GI: No nausea vomiting or bowel changes  : No hematuria or dysuria  Extremities: Adequate ROM though very painful moving lower extremities  Neuro: No change in cognition; no sensorimotor deficits  Remainder ROS unremarkable         ASSESSMENT:                                                                            Principal Problem:    Subarachnoid hemorrhage after traumatic injury without open intracranial wound, with prolonged loss of consciousness and return to pre-existing level of consciousness       1.  83-year-old WM with subarachnoid hemorrhage and scalp laceration secondary to fall with blunt trauma     2.  Nondisplaced bilateral pelvic fracture-secondary to fall     3.  Essential hypertension-mildly accelerated on admission; generally well regulated on telmisartan with HCTZ     4 mild hyperlipidemia-not on statin therapy     5.  Moderately severe bilateral sensorineural hearing loss with bilateral hearing aids     6.  History of osteopenia-maintained on calcium and vitamin D supplements      PLAN:                                                                            Continue aggressive supportive care with analgesia and physical therapy.  Reviewed repeat head CT-unchanged; stable  Vigilant management of essential hypertension; maintaining systolic blood pressure below 150 with ARB plus as needed hydralazine  Making arrangements for outpatient physical therapy and at home assistance with anticipation of discharge to home in a.m.       OBJECTIVE                                                                        Exam:  /75 (BP Location: Right arm, Patient Position: Lying)   Pulse 79   Temp 98.5 °F (36.9 °C) (Oral)   Resp 28   Ht 170.2 cm (67\")   Wt 70.6 kg (155 lb 10.3 oz)   SpO2 96%   BMI 24.38 kg/m²   Intake/Output last 3 shifts:  I/O last 3 completed shifts:  In: 240 [P.O.:240]  Out: 925 " [Urine:925]  Intake/Output this shift:  I/O this shift:  In: 240 [P.O.:240]  Out: 200 [Urine:200]    General Appearance:  Alert, cooperative, no distress, appears stated age  HEENT: Occipital laceration looks good without drainage.  Right periorbital ecchymoses with minimal swelling.  PERRL; EOMI, sclera clear, nose and oropharynx unremarkable with mucosa moist  Neck:  Supple, no adenopathy; no JVD or bruits  Lungs:   Clear to auscultation with marginally adequate airflow-some splinting on deep inspiratory effort and tenderness over right lateral chest wall  Heart: RRR with normal S1-S2 no murmurs or rub  Abdomen:   Soft, nontender, no masses, no hepatomegaly, no splenomegaly  Extremities: Adequate ROM without peripheral cyanosis or edema; good distal pulses.  Neurologic:   Alert and oriented, no focal deficits    Scheduled Meds:cholecalciferol, 1,000 Units, Oral, Daily  losartan, 100 mg, Oral, Q24H  lubiprostone, 24 mcg, Oral, BID With Meals  melatonin, 10 mg, Oral, Nightly  pantoprazole, 40 mg, Oral, QAM AC  prednisoLONE acetate, 1 drop, Both Eyes, Daily  senna-docusate sodium, 2 tablet, Oral, BID  sodium chloride, 10 mL, Intravenous, Q12H      Continuous Infusions:   PRN Meds:  acetaminophen    senna-docusate sodium **AND** polyethylene glycol **AND** bisacodyl **AND** bisacodyl    hydrALAZINE    HYDROmorphone    oxyCODONE    sodium chloride    sodium chloride         Data Review:                                                                              Lab Results (last 24 hours)       Procedure Component Value Units Date/Time    Basic Metabolic Panel [[id number]]  (Abnormal) Collected: 11/10/23 0143    Specimen: Blood Updated: 11/10/23 0255     Glucose 129 mg/dL      BUN 22 mg/dL      Creatinine 0.90 mg/dL      Sodium 138 mmol/L      Potassium 3.6 mmol/L      Chloride 102 mmol/L      CO2 25.0 mmol/L      Calcium 9.0 mg/dL      BUN/Creatinine Ratio 24.4     Anion Gap 11.0 mmol/L      eGFR 85.3 mL/min/1.73      Narrative:      GFR Normal >60  Chronic Kidney Disease <60  Kidney Failure <15    The GFR formula is only valid for adults with stable renal function between ages 18 and 70.    CBC (No Diff) [[id number]]  (Abnormal) Collected: 11/10/23 0143    Specimen: Blood Updated: 11/10/23 0227     WBC 8.20 10*3/mm3      RBC 3.73 10*6/mm3      Hemoglobin 12.3 g/dL      Hematocrit 36.1 %      MCV 96.7 fL      MCH 32.9 pg      MCHC 34.1 g/dL      RDW 13.2 %      RDW-SD 46.8 fl      MPV 7.8 fL      Platelets 203 10*3/mm3     Urinalysis With Microscopic If Indicated (No Culture) - Urine, Clean Catch [[id number]]  (Normal) Collected: 11/09/23 1743    Specimen: Urine, Clean Catch Updated: 11/09/23 1808     Color, UA Yellow     Appearance, UA Clear     pH, UA 6.0     Specific Gravity, UA 1.026     Glucose, UA Negative     Ketones, UA Negative     Bilirubin, UA Negative     Blood, UA Negative     Protein, UA Negative     Leuk Esterase, UA Negative     Nitrite, UA Negative     Urobilinogen, UA 0.2 E.U./dL    Narrative:      Urine microscopic not indicated.    Comprehensive Metabolic Panel [[id number]]  (Abnormal) Collected: 11/09/23 1438    Specimen: Blood Updated: 11/09/23 1517     Glucose 165 mg/dL      BUN 29 mg/dL      Creatinine 0.94 mg/dL      Sodium 138 mmol/L      Potassium 4.6 mmol/L      Chloride 102 mmol/L      CO2 25.0 mmol/L      Calcium 9.7 mg/dL      Total Protein 6.4 g/dL      Albumin 3.9 g/dL      ALT (SGPT) 30 U/L      AST (SGOT) 38 U/L      Alkaline Phosphatase 54 U/L      Total Bilirubin 1.0 mg/dL      Globulin 2.5 gm/dL      A/G Ratio 1.6 g/dL      BUN/Creatinine Ratio 30.9     Anion Gap 11.0 mmol/L      eGFR 80.9 mL/min/1.73     Narrative:      GFR Normal >60  Chronic Kidney Disease <60  Kidney Failure <15    The GFR formula is only valid for adults with stable renal function between ages 18 and 70.    aPTT [[id number]]  (Abnormal) Collected: 11/09/23 1438    Specimen: Blood from Arm, Right Updated: 11/09/23 1513      PTT 23.4 seconds     Protime-INR [[id number]]  (Normal) Collected: 11/09/23 1438    Specimen: Blood from Arm, Right Updated: 11/09/23 1513     Protime 11.1 Seconds      INR 1.02    CBC Auto Differential [[id number]]  (Abnormal) Collected: 11/09/23 1438    Specimen: Blood Updated: 11/09/23 1502     WBC 15.10 10*3/mm3      RBC 4.07 10*6/mm3      Hemoglobin 13.4 g/dL      Hematocrit 39.5 %      MCV 97.0 fL      MCH 32.8 pg      MCHC 33.8 g/dL      RDW 13.0 %      RDW-SD 45.9 fl      MPV 7.6 fL      Platelets 219 10*3/mm3      Neutrophil % 91.1 %      Lymphocyte % 2.8 %      Monocyte % 6.1 %      Eosinophil % 0.0 %      Basophil % 0.0 %      Neutrophils, Absolute 13.70 10*3/mm3      Lymphocytes, Absolute 0.40 10*3/mm3      Monocytes, Absolute 0.90 10*3/mm3      Eosinophils, Absolute 0.00 10*3/mm3      Basophils, Absolute 0.00 10*3/mm3      nRBC 0.0 /100 WBC                  Imaging:                                                                             Imaging Results (Last 24 Hours)       Procedure Component Value Units Date/Time    CT Head Without Contrast [[id number]] Collected: 11/09/23 1810     Updated: 11/09/23 1814    Narrative:      CT HEAD WO CONTRAST    Date of Exam: 11/9/2023 5:48 PM EST    Indication: f/u [facility].    Comparison: CT head without contrast 11/9/2023 12:09 p.m.    Technique: Axial CT images were obtained of the head without contrast administration.  Coronal reconstructions were performed.  Automated exposure control and iterative reconstruction methods were used.      Findings:  Small moderate subarachnoid hemorrhage in the inferior bilateral frontal lobes. This is unchanged compared to previous study. No new sites of intracranial hemorrhage.    Brain volume is appropriate for patient's age. Sulci ventricle size is symmetric. The gray-white matter differentiation is intact. There are no focal hypodensities to suggest acute or subacute infarct. There is mild soft tissue swelling of the posterior    scalp. The globes and extraocular muscles are normal in appearance. Mastoid air cells are well-aerated. Mucosal thickening in the maxillary sinuses, sphenoid sinuses ethmoid air cells and inferior frontal sinuses. No displaced or depressed skull   fracture.      Impression:      Impression:  No change from previous study. Small amount of subarachnoid hemorrhage in the inferior anterior frontal lobes.      Electronically Signed: Katja Hu MD    2023 6:12 PM EST    Workstation ID: [id number]          SCANNED - TELEMETRY     Final Result      SCANNED - TELEMETRY     Final Result      SCANNED - TELEMETRY     Final Result      SCANNED - TELEMETRY     Final Result      SCANNED - TELEMETRY     Final Result      SCANNED - TELEMETRY     Final Result               Andres Leong MD  [facility]  11/10/2023  14:09 EST      Electronically signed by Andres Leong MD at 11/10/23 1423          Physical Therapy Notes (last 24 hours)        Teresita Johnson, PT at 11/10/23 1340  Version 1 of 1         Patient Name: Anthony Lugo  : 1940    MRN: [id number]                              Today's Date: 11/10/2023       Admit Date: 2023    Visit Dx:     ICD-10-CM ICD-9-CM   1. 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    Hemorrhoids    History of colonic polyps    Hyperbilirubinemia    Hypokalemia    Knee pain, right    Liver nodule    Paroxysmal supraventricular tachycardia    Pinguecula    Red eye    Rupture of biceps tendon    Subarachnoid hemorrhage after traumatic injury without open intracranial wound, with  prolonged loss of consciousness and return to pre-existing level of consciousness     Past Medical History:   Diagnosis Date    Hypertension     Mumps      Past Surgical History:   Procedure Laterality Date    COLONOSCOPY  2013    PROSTATECTOMY  2001      General Information       Row Name 11/10/23 1332          Physical Therapy Time and Intention    Document Type evaluation  -OD     Mode of Treatment physical therapy  -OD       Row Name 11/10/23 1332          General Information    Patient Profile Reviewed yes  -OD     Prior Level of Function independent:;ADL's;driving;work;all household mobility;community mobility  -OD     Existing Precautions/Restrictions other (see comments);fall  SBP < 150  -OD     Barriers to Rehab none identified  -OD       Row Name 11/10/23 1332          Living Environment    People in Home spouse  -OD       Row Name 11/10/23 1332          Home Main Entrance    Number of Stairs, Main Entrance five  -OD       Row Name 11/10/23 1332          Stairs Within Home, Primary    Number of Stairs, Within Home, Primary none  -OD       Row Name 11/10/23 1332          Cognition    Orientation Status (Cognition) oriented x 4  -OD       Row Name 11/10/23 1332          Safety Issues, Functional Mobility    Impairments Affecting Function (Mobility) pain  -OD               User Key  (r) = Recorded By, (t) = Taken By, (c) = Cosigned By      Initials Name Provider Type    OD Teresita Johnson PT Physical Therapist                   Mobility       Row Name 11/10/23 1333          Bed Mobility    Bed Mobility bed mobility (all) activities  -OD     All Activities, [place] (Bed Mobility) minimum assist (75% patient effort)  -OD     Assistive Device (Bed Mobility) draw sheet  -OD       Row Name 11/10/23 1333          Bed-Chair Transfer    Bed-Chair [place] (Transfers) contact guard  -OD     Assistive Device (Bed-Chair Transfers) walker, front-wheeled  -OD       Row Name 11/10/23 1333          Sit-Stand  Transfer    Sit-Stand [place] (Transfers) minimum assist (75% patient effort);contact guard  -OD     Assistive Device (Sit-Stand Transfers) walker, front-wheeled  -OD       Row Name 11/10/23 1333          Gait/Stairs (Locomotion)    [place] Level (Gait) contact guard  -OD     Assistive Device (Gait) walker, front-wheeled  -OD     Distance in Feet (Gait) 5 feet  -OD               User Key  (r) = Recorded By, (t) = Taken By, (c) = Cosigned By      Initials Name Provider Type    Teresita Garcia PT Physical Therapist                   Obj/Interventions       Row Name 11/10/23 1333          Range of Motion Comprehensive    General Range of Motion bilateral lower extremity ROM WFL  -OD       Row Name 11/10/23 1333          Strength Comprehensive (MMT)    General Manual Muscle Testing (MMT) Assessment no strength deficits identified  -OD     Comment, General Manual Muscle Testing (MMT) Assessment No formal MMT performed d/t significant pain s/p bilat pelvic frx  -OD       Row Name 11/10/23 1333          Balance    Balance Interventions sitting;standing;minimal challenge  -OD       Row Name 11/10/23 1333          Sensory Assessment (Somatosensory)    Sensory Assessment (Somatosensory) sensation intact  -OD               User Key  (r) = Recorded By, (t) = Taken By, (c) = Cosigned By      Initials Name Provider Type    Teresita Garcia PT Physical Therapist                   Goals/Plan       Row Name 11/10/23 1972          Bed Mobility Goal 1 (PT)    Activity/Assistive Device (Bed Mobility Goal 1, PT) bed mobility activities, all  -OD     [place] Level/Cues Needed (Bed Mobility Goal 1, PT) independent  -OD     Time Frame (Bed Mobility Goal 1, PT) long term goal (LTG);2 weeks  -OD       Row Name 11/10/23 5408          Transfer Goal 1 (PT)    Activity/Assistive Device (Transfer Goal 1, PT) transfers, all;walker, rolling  -OD     [place] Level/Cues Needed (Transfer Goal 1, PT) modified independence  -OD      Time Frame (Transfer Goal 1, PT) long term goal (LTG);2 weeks  -OD       Row Name 11/10/23 1339          Gait Training Goal 1 (PT)    Activity/Assistive Device (Gait Training Goal 1, PT) gait (walking locomotion);assistive device use;walker, rolling  -OD     [place] Level (Gait Training Goal 1, PT) modified independence  -OD     Distance (Gait Training Goal 1, PT) 50 feet  -OD     Time Frame (Gait Training Goal 1, PT) long term goal (LTG);2 weeks  -OD       Row Name 11/10/23 1331          Stairs Goal 1 (PT)    Activity/Assistive Device (Stairs Goal 1, PT) stairs, all skills;ascending stairs;descending stairs  -OD     [place] Level/Cues Needed (Stairs Goal 1, PT) modified independence  -OD     Number of Stairs (Stairs Goal 1, PT) 5  -OD     Time Frame (Stairs Goal 1, PT) long term goal (LTG);2 weeks  -OD       Row Name 11/10/23 6996          Therapy Assessment/Plan (PT)    Planned Therapy Interventions (PT) balance training;bed mobility training;gait training;home exercise program;neuromuscular re-education;ROM (range of motion);postural re-education;transfer training;stair training;strengthening;stretching;patient/family education  -OD               User Key  (r) = Recorded By, (t) = Taken By, (c) = Cosigned By      Initials Name Provider Type    OD Teresita Johnson, PT Physical Therapist                   Clinical Impression       Row Name 11/10/23 3031          Pain    Pretreatment Pain Rating 8/10  -OD     Posttreatment Pain Rating 10/10  -OD     Pain Location generalized  -OD     Pain Location - back;buttock  -OD       Row Name 11/10/23 1332          Plan of Care Review    Plan of Care Reviewed With patient;spouse  -OD     Progress improving  -OD     Outcome Evaluation Pt is an 82 y/o M admitted to [facility] on 11/9/23 directly from Dr. Brian's office s/p fall at work. CT head (+) small subarachnoid hemorrhage bilat, R greater than L without midline shift. CT pelvis (+) medial R and L iliac fractures.  Neurosurgery reports strict BP control maintain SBP < 150. At baseline, pt is very active living in [facility] with his spouse and works full-time for his own company. Pt has supportive immediate and extended family that will be able to help when he goes back home. Edu pt regarding use of FWW for supporting pt when standing and ambulating for reducing pain and reducing weight-bearing BLE. Pt required Gene for supine>sit EOB, CGA-Gene for STS, and CGA for amb to bedside chair. Pt limited mostly d/t significant pain as related to pelvic fracture. Discussed d/c planning with patient, spouse, and patient's PCP. Pt will be safe to d/c home with family assist and would benefit from home health PT initially while pt heals and as his pain decreases, then he would benefit from OPPT for increased intensity. Pt will need RW and bedside commode upon d/c. Plan for PT to come saturday AM to work on stairs for improving safety when d/c home.  -OD       Row Name 11/10/23 1332          Therapy Assessment/Plan (PT)    Rehab Potential (PT) good, to achieve stated therapy goals  -OD     Criteria for Skilled Interventions Met (PT) yes;meets criteria  -OD     Therapy Frequency (PT) 5 times/wk  -OD     Predicted Duration of Therapy Intervention (PT) until d/c  -OD       Row Name 11/10/23 1334          Vital Signs    O2 Delivery Pre Treatment room air  -OD     O2 Delivery Intra Treatment room air  -OD     O2 Delivery Post Treatment room air  -OD     Pre Patient Position Supine  -OD     Intra Patient Position Standing  -OD     Post Patient Position Sitting  -OD       Row Name 11/10/23 1332          Positioning and Restraints    Pre-Treatment Position in bed  -OD     Post Treatment Position chair  -OD     In Chair notified nsg;with family/caregiver;sitting;call light within reach;exit alarm on;encouraged to call for assist  -OD               User Key  (r) = Recorded By, (t) = Taken By, (c) = Cosigned By      Initials Name Provider Type     Teresita Garcia, PT Physical Therapist                   Outcome Measures       Row Name 11/10/23 1339 11/10/23 0801       How much help from another person do you currently need...    Turning from your back to your side while in flat bed without using bedrails? 3  -OD 2  -EV    Moving from lying on back to sitting on the side of a flat bed without bedrails? 3  -OD 2  -EV    Moving to and from a bed to a chair (including a wheelchair)? 3  -OD 2  -EV    Standing up from a chair using your arms (e.g., wheelchair, bedside chair)? 3  -OD 1  -EV    Climbing 3-5 steps with a railing? 2  -OD 1  -EV    To walk in hospital room? 3  -OD 1  -EV    AM-PAC 6 Clicks Score (PT) 17  -OD 9  -EV    Highest level of mobility 5 --> Static standing  -OD 3 --> Sat at edge of bed  -EV      Row Name 11/10/23 0400          How much help from another person do you currently need...    Turning from your back to your side while in flat bed without using bedrails? 2  -DI     Moving from lying on back to sitting on the side of a flat bed without bedrails? 2  -DI     Moving to and from a bed to a chair (including a wheelchair)? 2  -DI     Standing up from a chair using your arms (e.g., wheelchair, bedside chair)? 1  -DI     Climbing 3-5 steps with a railing? 1  -DI     To walk in hospital room? 1  -DI     AM-PAC 6 Clicks Score (PT) 9  -DI     Highest level of mobility 3 --> Sat at edge of bed  -DI       Row Name 11/10/23 1339          Functional Assessment    Outcome Measure Options AM-PAC 6 Clicks Basic Mobility (PT)  -OD               User Key  (r) = Recorded By, (t) = Taken By, (c) = Cosigned By      Initials Name Provider Type    Nancy Cantu LPN Licensed Nurse    Velma Montero, RN Registered Nurse    Teresita Garcia, PT Physical Therapist                                 Physical Therapy Education       Title: PT OT SLP Therapies (Done)       Topic: Physical Therapy (Done)       Point: Mobility training (Done)        Learning Progress Summary             Patient Acceptance, E, VU by OD at 11/10/2023 1339                         Point: Home exercise program (Done)       Learning Progress Summary             Patient Acceptance, E, VU by OD at 11/10/2023 1339                         Point: Body mechanics (Done)       Learning Progress Summary             Patient Acceptance, E, VU by OD at 11/10/2023 1339                         Point: Precautions (Done)       Learning Progress Summary             Patient Acceptance, E, VU by OD at 11/10/2023 1339                                         User Key       Initials Effective Dates Name Provider Type Discipline    OD 05/11/23 -  Teresita Johnson, PT Physical Therapist PT                  PT Recommendation and Plan  Planned Therapy Interventions (PT): balance training, bed mobility training, gait training, home exercise program, neuromuscular re-education, ROM (range of motion), postural re-education, transfer training, stair training, strengthening, stretching, patient/family education  Plan of Care Reviewed With: patient, spouse  Progress: improving  Outcome Evaluation: Pt is an 84 y/o M admitted to [facility] on 11/9/23 directly from Dr. Brian's office s/p fall at work. CT head (+) small subarachnoid hemorrhage bilat, R greater than L without midline shift. CT pelvis (+) medial R and L iliac fractures. Neurosurgery reports strict BP control maintain SBP < 150. At baseline, pt is very active living in [address] with his spouse and works full-time for his own company. Pt has supportive immediate and extended family that will be able to help when he goes back home. Edu pt regarding use of FWW for supporting pt when standing and ambulating for reducing pain and reducing weight-bearing BLE. Pt required Gene for supine>sit EOB, CGA-Gene for STS, and CGA for amb to bedside chair. Pt limited mostly d/t significant pain as related to pelvic fracture. Discussed d/c planning with patient, spouse, and  patient's PCP. Pt will be safe to d/c home with family assist and would benefit from home health PT initially while pt heals and as his pain decreases, then he would benefit from OPPT for increased intensity. Pt will need RW and bedside commode upon d/c. Plan for PT to come saturday AM to work on stairs for improving safety when d/c home.     Time Calculation:         PT Charges       Row Name 11/10/23 1340             Time Calculation    Start Time 1056  -OD      Stop Time 1130  -OD      Time Calculation (min) 34 min  -OD      PT Received On 11/10/23  -OD      PT - Next Appointment 11/11/23  -OD      PT Goal Re-Cert Due Date 11/24/23  -OD         Time Calculation- PT    Total Timed Code Minutes- PT 12 minute(s)  -OD                User Key  (r) = Recorded By, (t) = Taken By, (c) = Cosigned By      Initials Name Provider Type    OD Teresita Johnson, PT Physical Therapist                  Therapy Charges for Today       Code Description Service Date Service Provider Modifiers Qty    [id number] HC PT EVAL MOD COMPLEXITY 4 11/10/2023 Teresita Johnson, PT GP 1    [id number]  PT THERAPEUTIC ACT EA 15 MIN 11/10/2023 Teresita Johnson, PT GP 1            PT G-Codes  Outcome Measure Options: AM-PAC 6 Clicks Basic Mobility (PT)  AM-PAC 6 Clicks Score (PT): 17  PT Discharge Summary  Anticipated Discharge Disposition (PT): home with home health    Teresita Johnson PT  11/10/2023      Electronically signed by Teresita Johnson, PT at 11/10/23 1341       Teresita Johnson, PT at 11/10/23 1341  Version 1 of 1         Goal Outcome Evaluation:  Plan of Care Reviewed With: patient, spouse        Progress: improving  Outcome Evaluation: Pt is an 82 y/o M admitted to [facility] on 11/9/23 directly from Dr. Brian's office s/p fall at work. CT head (+) small subarachnoid hemorrhage bilat, R greater than L without midline shift. CT pelvis (+) medial R and L iliac fractures. Neurosurgery reports strict BP control maintain SBP < 150. At baseline, pt is  very active living in [facility] [place] with his spouse and works full-time for his own company. Pt has supportive immediate and extended family that will be able to help when he goes back home. Edu pt regarding use of FWW for supporting pt when standing and ambulating for reducing pain and reducing weight-bearing BLE. Pt required Gene for supine>sit EOB, CGA-Gene for STS, and CGA for amb to bedside chair. Pt limited mostly d/t significant pain as related to pelvic fracture. Discussed d/c planning with patient, spouse, and patient's PCP. Pt will be safe to d/c home with family assist and would benefit from home health PT initially while pt heals and as his pain decreases, then he would benefit from OPPT for increased intensity. Pt will need RW and bedside commode upon d/c. Plan for PT to come saturday AM to work on stairs for improving safety when d/c home.      Anticipated Discharge Disposition (PT): home with home health    Electronically signed by Teresita Johnson, PT at 11/10/23 1341          Occupational Therapy Notes (last 24 hours)        Jarvis Marlow, OT at 11/10/23 1500          Patient Name: Anthony Lugo  : 1940    MRN: [id number]                              Today's Date: 11/10/2023       Admit Date: 2023    Visit Dx:     ICD-10-CM ICD-9-CM   1. 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    Hemorrhoids    History of colonic polyps    Hyperbilirubinemia    Hypokalemia    Knee pain, right    Liver nodule    Paroxysmal supraventricular tachycardia    Pinguecula    Red eye    Rupture of biceps tendon    Subarachnoid hemorrhage after traumatic  injury without open intracranial wound, with prolonged loss of consciousness and return to pre-existing level of consciousness     Past Medical History:   Diagnosis Date    Hypertension     Mumps      Past Surgical History:   Procedure Laterality Date    COLONOSCOPY  2013    PROSTATECTOMY  2001      General Information       Row Name 11/10/23 1447          OT Time and Intention    Document Type evaluation  -     Mode of Treatment occupational therapy  -       Row Name 11/10/23 1447          General Information    Patient Profile Reviewed yes  -LS     Prior Level of Function independent:;ADL's;driving;work;all household mobility  -     Existing Precautions/Restrictions other (see comments);fall  SBP<150  -     Barriers to Rehab none identified  -       Row Name 11/10/23 1447          Living Environment    People in Home spouse  -       Row Name 11/10/23 1447          Home Main Entrance    Number of Stairs, Main Entrance five  -       Row Name 11/10/23 1447          Stairs Within Home, Primary    Number of Stairs, Within Home, Primary none  -       Row Name 11/10/23 1447          Cognition    Orientation Status (Cognition) oriented x 4  -       Row Name 11/10/23 1447          Safety Issues, Functional Mobility    Impairments Affecting Function (Mobility) pain  -               User Key  (r) = Recorded By, (t) = Taken By, (c) = Cosigned By      Initials Name Provider Type    LS Jarvis Marlow OT Occupational Therapist                     Mobility/ADL's       Row Name 11/10/23 1448          Bed Mobility    Bed Mobility bed mobility (all) activities  -     All Activities, [place] (Bed Mobility) minimum assist (75% patient effort);moderate assist (50% patient effort);2 person assist  -     Assistive Device (Bed Mobility) draw sheet;head of bed elevated  -       Row Name 11/10/23 1448          Transfers    Transfers bed-chair transfer;sit-stand transfer  -       Row Name 11/10/23 1441           Bed-Chair Transfer    Bed-Chair [place] (Transfers) minimum assist (75% patient effort)  -     Assistive Device (Bed-Chair Transfers) walker, front-wheeled  -LS       Row Name 11/10/23 1448          Sit-Stand Transfer    Sit-Stand [place] (Transfers) minimum assist (75% patient effort)  -     Assistive Device (Sit-Stand Transfers) walker, front-wheeled  -LS       Row Name 11/10/23 1448          Functional Mobility    Functional Mobility- Ind. Level minimum assist (75% patient effort)  -     Functional Mobility- Device walker, front-wheeled  -LS       Row Name 11/10/23 1448          Activities of Daily Living    BADL Assessment/Intervention lower body dressing;toileting  -       Row Name 11/10/23 1448          Lower Body Dressing Assessment/Training    [place] Level (Lower Body Dressing) lower body dressing skills;maximum assist (25% patient effort)  -[facility] Name 11/10/23 1448          Toileting Assessment/Training    [place] Level (Toileting) toileting skills;maximum assist (25% patient effort)  -               User Key  (r) = Recorded By, (t) = Taken By, (c) = Cosigned By      Initials Name Provider Type     Jarvis Marlow OT Occupational Therapist                   Obj/Interventions       Row Name 11/10/23 1450          Sensory Assessment (Somatosensory)    Sensory Assessment (Somatosensory) sensation intact  -[facility] Name 11/10/23 1450          Range of Motion Comprehensive    General Range of Motion bilateral upper extremity ROM WFL  -[facility] Name 11/10/23 1450          Strength Comprehensive (MMT)    General Manual Muscle Testing (MMT) Assessment no strength deficits identified  -[facility] Name 11/10/23 1450          Balance    Balance Assessment sitting static balance;sitting dynamic balance;standing static balance;standing dynamic balance  -     Static Sitting Balance modified independence  -     Dynamic Sitting Balance modified independence  -      Position, Sitting Balance sitting edge of bed  -LS     Static Standing Balance contact guard  -LS     Dynamic Standing Balance minimal assist  -LS     Position/Device Used, Standing Balance walker, front-wheeled  -LS               User Key  (r) = Recorded By, (t) = Taken By, (c) = Cosigned By      Initials Name Provider Type    Jarvis Clemens OT Occupational Therapist                   Goals/Plan       Row Name 11/10/23 1456          Bed Mobility Goal 1 (OT)    Activity/Assistive Device (Bed Mobility Goal 1, OT) bed mobility activities, all  -LS     [place] Level/Cues Needed (Bed Mobility Goal 1, OT) standby assist  -LS     Time Frame (Bed Mobility Goal 1, OT) long term goal (LTG);2 weeks  -       Row Name 11/10/23 1456          Transfer Goal 1 (OT)    Activity/Assistive Device (Transfer Goal 1, OT) transfers, all;walker, rolling  -LS     [place] Level/Cues Needed (Transfer Goal 1, OT) contact guard required  -LS     Time Frame (Transfer Goal 1, OT) long term goal (LTG);2 weeks  -       Row Name 11/10/23 1456          Dressing Goal 1 (OT)    Activity/Device (Dressing Goal 1, OT) dressing skills, all  -LS     [place]/Cues Needed (Dressing Goal 1, OT) contact guard required  -LS     Time Frame (Dressing Goal 1, OT) long term goal (LTG);2 weeks  -       Row Name 11/10/23 1456          Toileting Goal 1 (OT)    Activity/Device (Toileting Goal 1, OT) toileting skills, all  -LS     [place] Level/Cues Needed (Toileting Goal 1, OT) contact guard required  -LS     Time Frame (Toileting Goal 1, OT) long term goal (LTG);2 weeks  -       Row Name 11/10/23 1456          Therapy Assessment/Plan (OT)    Planned Therapy Interventions (OT) activity tolerance training;BADL retraining;functional balance retraining;IADL retraining;occupation/activity based interventions;ROM/therapeutic exercise;strengthening exercise;transfer/mobility retraining;patient/caregiver education/training;neuromuscular  control/coordination retraining  -LS               User Key  (r) = Recorded By, (t) = Taken By, (c) = Cosigned By      Initials Name Provider Type    Jarvis Clemens, ADDIS Occupational Therapist                   Clinical Impression       Row Name 11/10/23 1529          Pain Assessment    Pretreatment Pain Rating 8/10  -LS     Posttreatment Pain Rating 10/10  -LS     Pain Location generalized  -LS     Pain Location - back;buttock  -LS       Row Name 11/10/23 6319          Plan of Care Review    Plan of Care Reviewed With patient;spouse  -LS     Outcome Evaluation Anthony Lugo is a 83 y.o. male that was direct admit from PCPs office.  Patient suffered a fall today off of an elevated surface landing backwards striking the back of his head. His PCP ordered imaging which demonstrated small amount of hyperintensities in the bifrontal region suggestive of subarachnoid hemorrhage.  Orthopedics also consulted for reported pelvic fracture. Imaging confirms SAH. Systolic BP must now remain below 150. Repeat CT shows SAH is stable, with no change from original scan. At baseline, pt is very active living in [address] with his spouse and works full-time for his own company. Pt has supportive immediate and extended family that will be able to help when he goes back home. This date, pt reports 8/10 pain, but is able to participate in OT evaluation. Mod A needed for bod mobility. At EOB, Max A needed for lower body dressing. Min A required to come to standing and to transfer via use of RW. Max A required for toileting for hygiene. Pts family intends to take him home and assist as needed. OT feels during this time HHOT would be beneficial. Pt would be unable to tolerate inpatient rehab during the acute stages of healing secondary to pain levels. Recommend a RW and 3-in-1 combo chair upon dc to increase safety and assist with ADL care.  -LS       Row Name 11/10/23 0019          Therapy Assessment/Plan (OT)    Rehab Potential  (OT) good, to achieve stated therapy goals  -LS     Criteria for Skilled Therapeutic Interventions Met (OT) yes;skilled treatment is necessary  -LS     Therapy Frequency (OT) 3 times/wk  -LS     Predicted Duration of Therapy Intervention (OT) until dc  -LS       Row Name 11/10/23 1450          Therapy Plan Review/Discharge Plan (OT)    Equipment Needs Upon Discharge (OT) walker, rolling;other (see comments)  3-in-1 combo chair  -LS     Anticipated Discharge Disposition (OT) home with /7 care;home with home health  -LS       Row Name 11/10/23 1450          Vital Signs    O2 Delivery Pre Treatment room air  -LS     O2 Delivery Intra Treatment room air  -LS     Post SpO2 (%) 99  -LS     O2 Delivery Post Treatment room air  -LS     Pre Patient Position Supine  -LS     Intra Patient Position Standing  -LS     Post Patient Position Sitting  -LS       Row Name 11/10/23 1450          Positioning and Restraints    Pre-Treatment Position in bed  -LS     Post Treatment Position bsc  -LS     In Chair notified nsg;sitting;call light within reach;encouraged to call for assist;with family/caregiver;with other staff  -               User Key  (r) = Recorded By, (t) = Taken By, (c) = Cosigned By      Initials Name Provider Type    LS Jarvis Marlow, ADDIS Occupational Therapist                   Outcome Measures       Row Name 11/10/23 1457          How much help from another is currently needed...    Putting on and taking off regular lower body clothing? 2  -LS     Bathing (including washing, rinsing, and drying) 2  -LS     Toileting (which includes using toilet bed pan or urinal) 2  -LS     Putting on and taking off regular upper body clothing 2  -LS     Taking care of personal grooming (such as brushing teeth) 2  -LS     Eating meals 4  -LS     AM-PAC 6 Clicks Score (OT) 14  -LS       Row Name 11/10/23 1339 11/10/23 0801       How much help from another person do you currently need...    Turning from your back to your side while  in flat bed without using bedrails? 3  -OD 2  -EV    Moving from lying on back to sitting on the side of a flat bed without bedrails? 3  -OD 2  -EV    Moving to and from a bed to a chair (including a wheelchair)? 3  -OD 2  -EV    Standing up from a chair using your arms (e.g., wheelchair, bedside chair)? 3  -OD 1  -EV    Climbing 3-5 steps with a railing? 2  -OD 1  -EV    To walk in hospital room? 3  -OD 1  -EV    AM-PAC 6 Clicks Score (PT) 17  -OD 9  -EV    Highest level of mobility 5 --> Static standing  -OD 3 --> Sat at edge of bed  -EV      Row Name 11/10/23 0400          How much help from another person do you currently need...    Turning from your back to your side while in flat bed without using bedrails? 2  -DI     Moving from lying on back to sitting on the side of a flat bed without bedrails? 2  -DI     Moving to and from a bed to a chair (including a wheelchair)? 2  -DI     Standing up from a chair using your arms (e.g., wheelchair, bedside chair)? 1  -DI     Climbing 3-5 steps with a railing? 1  -DI     To walk in hospital room? 1  -DI     AM-PAC 6 Clicks Score (PT) 9  -DI     Highest level of mobility 3 --> Sat at edge of bed  -DI       Row Name 11/10/23 1457 11/10/23 1339       Functional Assessment    Outcome Measure Options AM-PAC 6 Clicks Daily Activity (OT)  -LS AM-PAC 6 Clicks Basic Mobility (PT)  -OD              User Key  (r) = Recorded By, (t) = Taken By, (c) = Cosigned By      Initials Name Provider Type    Nancy Cantu LPN Licensed Nurse    Jarvis Clemens OT Occupational Therapist    Velma Montero, RN Registered Nurse    Teresita Garcia PT Physical Therapist                    Occupational Therapy Education       Title: PT OT SLP Therapies (Done)       Topic: Occupational Therapy (Done)       Point: ADL training (Done)       Description:   Instruct learner(s) on proper safety adaptation and remediation techniques during self care or transfers.   Instruct in proper use  of assistive devices.                  Learning Progress Summary             Patient Acceptance, E,TB, VU by TYRONE at 11/10/2023 1914                                         User Key       Initials Effective Dates Name Provider Type Discipline    TYRONE 09/22/22 -  Jarvis Marlow OT Occupational Therapist OT                  OT Recommendation and Plan  Planned Therapy Interventions (OT): activity tolerance training, BADL retraining, functional balance retraining, IADL retraining, occupation/activity based interventions, ROM/therapeutic exercise, strengthening exercise, transfer/mobility retraining, patient/caregiver education/training, neuromuscular control/coordination retraining  Therapy Frequency (OT): 3 times/wk  Plan of Care Review  Plan of Care Reviewed With: patient, spouse  Outcome Evaluation: Anthony Lugo is a 83 y.o. male that was direct admit from PCPs office.  Patient suffered a fall today off of an elevated surface landing backwards striking the back of his head. His PCP ordered imaging which demonstrated small amount of hyperintensities in the bifrontal region suggestive of subarachnoid hemorrhage.  Orthopedics also consulted for reported pelvic fracture. Imaging confirms SAH. Systolic BP must now remain below 150. Repeat CT shows SAH is stable, with no change from original scan. At baseline, pt is very active living in [address] with his spouse and works full-time for his own company. Pt has supportive immediate and extended family that will be able to help when he goes back home. This date, pt reports 8/10 pain, but is able to participate in OT evaluation. Mod A needed for bod mobility. At EOB, Max A needed for lower body dressing. Min A required to come to standing and to transfer via use of RW. Max A required for toileting for hygiene. Pts family intends to take him home and assist as needed. OT feels during this time HHOT would be beneficial. Pt would be unable to tolerate inpatient rehab during  the acute stages of healing secondary to pain levels. Recommend a RW and 3-in-1 combo chair upon dc to increase safety and assist with ADL care.     Time Calculation:         Time Calculation- OT       Row Name 11/10/23 1458             Time Calculation- OT    OT Start Time 1056  -LS      OT Stop Time 1133  -LS      OT Time Calculation (min) 37 min  -LS      Total Timed Code Minutes- OT 10 minute(s)  -LS      OT Received On 11/10/23  -LS      OT - Next Appointment 11/13/23  -      OT Goal Re-Cert Due Date 11/24/23  -         Timed Charges    76789 - OT Self Care/Mgmt Minutes 10  -LS         Untimed Charges    OT Eval/Re-eval Minutes 27  -LS         Total Minutes    Timed Charges Total Minutes 10  -LS      Untimed Charges Total Minutes 27  -LS       Total Minutes 37  -LS                User Key  (r) = Recorded By, (t) = Taken By, (c) = Cosigned By      Initials Name Provider Type    LS Jarvis Marlow OT Occupational Therapist                  Therapy Charges for Today       Code Description Service Date Service Provider Modifiers Qty    [id number] HC OT SELF CARE/MGMT/TRAIN EA 15 MIN 11/10/2023 Jarvis Marlow OT GO 1    [id number]  OT EVAL MOD COMPLEXITY 4 11/10/2023 Jarvis aMrlow OT GO 1                 Jarvis Marlow OT  11/10/2023    Electronically signed by Jarvis Marlow OT at 11/10/23 1500       Jarvis Marlow OT at 11/10/23 1458          Goal Outcome Evaluation:  Plan of Care Reviewed With: patient, spouse           Outcome Evaluation: Anthony Lugo is a 83 y.o. male that was direct admit from PCPs office.  Patient suffered a fall today off of an elevated surface landing backwards striking the back of his head. His PCP ordered imaging which demonstrated small amount of hyperintensities in the bifrontal region suggestive of subarachnoid hemorrhage.  Orthopedics also consulted for reported pelvic fracture. Imaging confirms SAH. Systolic BP must now remain below 150. Repeat CT shows SAH is stable, with  no change from original scan. At baseline, pt is very active living in [address] with his spouse and works full-time for his own company. Pt has supportive immediate and extended family that will be able to help when he goes back home. This date, pt reports 8/10 pain, but is able to participate in OT evaluation. Mod A needed for bod mobility. At EOB, Max A needed for lower body dressing. Min A required to come to standing and to transfer via use of RW. Max A required for toileting for hygiene. Pts family intends to take him home and assist as needed. OT feels during this time HHOT would be beneficial. Pt would be unable to tolerate inpatient rehab during the acute stages of healing secondary to pain levels. Recommend a RW and 3-in-1 combo chair upon dc to increase safety and assist with ADL care.      Anticipated Discharge Disposition (OT): home with 24/7 care, home with home health    Electronically signed by Jarvis Marlow OT at 11/10/23 0496

## 2023-11-11 NOTE — DISCHARGE SUMMARY
Discharge Summary      Name: Anthony Lugo ADMIT: 2023   : 1940  PROVIDER: Andres Leong MD    MRN: [id number] LOS: 1 days   AGE/SEX: 83 y.o. male  ROOM: [facility]/     Date of Service: 2023                        Date of Discharge:       2023    Discharge Diagnosis:      1.  83-year-old WM with subarachnoid hemorrhage and scalp laceration secondary to fall with blunt trauma     2.  Nondisplaced bilateral pelvic fracture-secondary to fall     3.  Essential hypertension-mildly accelerated on admission; generally well regulated on telmisartan with HCTZ; now with hydralazine added    4.  Right lower lobe airspace opacity; likely atelectasis and/or lung contusion with small right pleural effusion       Principal Problem:    Subarachnoid hemorrhage after traumatic injury without open intracranial wound, with prolonged loss of consciousness and return to pre-existing level of consciousness      Presenting Problem/History of Present Illness     Active Hospital Problems    Diagnosis  POA    **Subarachnoid hemorrhage after traumatic injury without open intracranial wound, with prolonged loss of consciousness and return to pre-existing level of consciousness [S06.6X9A]  Yes      Resolved Hospital Problems   No resolved problems to display.       Hospital Course     Patient is a 83 y.o. male presented with following backwards off a 40 inch table resulting in blunt trauma to the back of his head and pelvis.  He presented initially to my office where his occipital scalp laceration was closed with interrupted sutures and transferred to [facility] radiology department where CT scanning images of head and pelvis revealed subarachnoid hemorrhage along with bilateral pelvic nondisplaced fractures with extension to the sacrum.  He was subsequently admitted for further evaluation supportive care.    Mr. Lugo was seen in consultation by neurosurgery who reviewed the initial head CT as well as follow-up  CT scan performed 6 hours later that showed a stable small subarachnoid hemorrhage and did not warrant surgical therapy.  He was also seen in consultation by orthopedic surgeon Dr. Noonan, who reviewed his nondisplaced pelvic/sacrum fractures and recommended conservative medical management as well.    Mr. Lugo was treated with antihypertensive therapy that included losartan and a Apresoline maintaining systolic blood pressure below 150 mmHg.  He was given appropriate nonnarcotic analgesia-per his request.  He declined narcotic analgesia.  He was seen by physical therapy who assisted with getting him to stand and ambulate, as well as instruction to negotiate steps necessary for discharge to home.  Appropriate durable medical goods including bedside commode and rollator walker were ordered for management of his condition at home.  Blood pressure is now well regulated on combination therapy with ARB and hydralazine.  He has been instructed on incentive spirometry.  He is discharged home on 11/11/2023 in stable and satisfactory condition with arrangements for home physical therapy and follow-up to see me for suture removal in 5 days.      Procedures Performed:     CT imaging of head and pelvis           Consults:      Consults       Date and Time Order Name Status Description    11/9/2023  2:21 PM Inpatient Neurosurgery Consult Completed     11/9/2023  2:21 PM Inpatient Orthopedic Surgery Consult                Pertinent Test Results:      Reviewed in detail    Lab Results (last 48 hours)       Procedure Component Value Units Date/Time    Basic Metabolic Panel [[id number]]  (Abnormal) Collected: 11/10/23 0143    Specimen: Blood Updated: 11/10/23 0255     Glucose 129 mg/dL      BUN 22 mg/dL      Creatinine 0.90 mg/dL      Sodium 138 mmol/L      Potassium 3.6 mmol/L      Chloride 102 mmol/L      CO2 25.0 mmol/L      Calcium 9.0 mg/dL      BUN/Creatinine Ratio 24.4     Anion Gap 11.0 mmol/L      eGFR 85.3 mL/min/1.73      Narrative:      GFR Normal >60  Chronic Kidney Disease <60  Kidney Failure <15    The GFR formula is only valid for adults with stable renal function between ages 18 and 70.    CBC (No Diff) [[id number]]  (Abnormal) Collected: 11/10/23 0143    Specimen: Blood Updated: 11/10/23 0227     WBC 8.20 10*3/mm3      RBC 3.73 10*6/mm3      Hemoglobin 12.3 g/dL      Hematocrit 36.1 %      MCV 96.7 fL      MCH 32.9 pg      MCHC 34.1 g/dL      RDW 13.2 %      RDW-SD 46.8 fl      MPV 7.8 fL      Platelets 203 10*3/mm3     Urinalysis With Microscopic If Indicated (No Culture) - Urine, Clean Catch [[id number]]  (Normal) Collected: 11/09/23 1743    Specimen: Urine, Clean Catch Updated: 11/09/23 1808     Color, UA Yellow     Appearance, UA Clear     pH, UA 6.0     Specific Gravity, UA 1.026     Glucose, UA Negative     Ketones, UA Negative     Bilirubin, UA Negative     Blood, UA Negative     Protein, UA Negative     Leuk Esterase, UA Negative     Nitrite, UA Negative     Urobilinogen, UA 0.2 E.U./dL    Narrative:      Urine microscopic not indicated.    Comprehensive Metabolic Panel [[id number]]  (Abnormal) Collected: 11/09/23 1438    Specimen: Blood Updated: 11/09/23 1517     Glucose 165 mg/dL      BUN 29 mg/dL      Creatinine 0.94 mg/dL      Sodium 138 mmol/L      Potassium 4.6 mmol/L      Chloride 102 mmol/L      CO2 25.0 mmol/L      Calcium 9.7 mg/dL      Total Protein 6.4 g/dL      Albumin 3.9 g/dL      ALT (SGPT) 30 U/L      AST (SGOT) 38 U/L      Alkaline Phosphatase 54 U/L      Total Bilirubin 1.0 mg/dL      Globulin 2.5 gm/dL      A/G Ratio 1.6 g/dL      BUN/Creatinine Ratio 30.9     Anion Gap 11.0 mmol/L      eGFR 80.9 mL/min/1.73     Narrative:      GFR Normal >60  Chronic Kidney Disease <60  Kidney Failure <15    The GFR formula is only valid for adults with stable renal function between ages 18 and 70.    aPTT [[id number]]  (Abnormal) Collected: 11/09/23 1438    Specimen: Blood from Arm, Right Updated: 11/09/23 1513      PTT 23.4 seconds     Protime-INR [[id number]]  (Normal) Collected: 11/09/23 1438    Specimen: Blood from Arm, Right Updated: 11/09/23 1513     Protime 11.1 Seconds      INR 1.02    CBC Auto Differential [[id number]]  (Abnormal) Collected: 11/09/23 1438    Specimen: Blood Updated: 11/09/23 1502     WBC 15.10 10*3/mm3      RBC 4.07 10*6/mm3      Hemoglobin 13.4 g/dL      Hematocrit 39.5 %      MCV 97.0 fL      MCH 32.8 pg      MCHC 33.8 g/dL      RDW 13.0 %      RDW-SD 45.9 fl      MPV 7.6 fL      Platelets 219 10*3/mm3      Neutrophil % 91.1 %      Lymphocyte % 2.8 %      Monocyte % 6.1 %      Eosinophil % 0.0 %      Basophil % 0.0 %      Neutrophils, Absolute 13.70 10*3/mm3      Lymphocytes, Absolute 0.40 10*3/mm3      Monocytes, Absolute 0.90 10*3/mm3      Eosinophils, Absolute 0.00 10*3/mm3      Basophils, Absolute 0.00 10*3/mm3      nRBC 0.0 /100 WBC           Imaging Results (Last 7 Days)       Procedure Component Value Units Date/Time    XR Chest PA & Lateral [[id number]] Collected: 11/10/23 1634     Updated: 11/10/23 1638    Narrative:      XR CHEST PA AND LATERAL    Date of Exam: 11/10/2023 4:18 PM EST    Indication: Fall    Comparison: Chest x-ray 11/6/2023 CT pelvis and head 11/9/2023    Findings:  Lungs normal expanded. No cardiomegaly. Mild right lower lobe airspace opacity. Left lung is clear. Pulmonary vessels are distinct. Calcified granuloma right lower lobe. No pneumothorax. Small right pleural effusion suspected. No acute displaced rib   fractures.      Impression:      Impression:  Right lower lobe airspace opacity and small right pleural effusion.  Cardiomegaly.      Electronically Signed: Katja Hu MD    11/10/2023 4:36 PM EST    Workstation ID: [id number]    CT Head Without Contrast [[id number]] Collected: 11/09/23 1810     Updated: 11/09/23 1814    Narrative:      CT HEAD WO CONTRAST    Date of Exam: 11/9/2023 5:48 PM EST    Indication: f/u [facility].    Comparison: CT head without contrast 11/9/2023  12:09 p.m.    Technique: Axial CT images were obtained of the head without contrast administration.  Coronal reconstructions were performed.  Automated exposure control and iterative reconstruction methods were used.      Findings:  Small moderate subarachnoid hemorrhage in the inferior bilateral frontal lobes. This is unchanged compared to previous study. No new sites of intracranial hemorrhage.    Brain volume is appropriate for patient's age. Sulci ventricle size is symmetric. The gray-white matter differentiation is intact. There are no focal hypodensities to suggest acute or subacute infarct. There is mild soft tissue swelling of the posterior   scalp. The globes and extraocular muscles are normal in appearance. Mastoid air cells are well-aerated. Mucosal thickening in the maxillary sinuses, sphenoid sinuses ethmoid air cells and inferior frontal sinuses. No displaced or depressed skull   fracture.      Impression:      Impression:  No change from previous study. Small amount of subarachnoid hemorrhage in the inferior anterior frontal lobes.      Electronically Signed: Katja Hu MD    11/9/2023 6:12 PM EST    Workstation ID: [id number]          SCANNED - TELEMETRY     Final Result      SCANNED - TELEMETRY     Final Result      SCANNED - TELEMETRY     Final Result      SCANNED - TELEMETRY     Final Result      SCANNED - TELEMETRY     Final Result      SCANNED - TELEMETRY     Final Result      SCANNED - TELEMETRY     Final Result      SCANNED - TELEMETRY     Final Result      SCANNED - TELEMETRY     Final Result      SCANNED - TELEMETRY     Final Result          Condition on Discharge:     Stable; satisfactory for discharge home  Vital Signs     Temp:  [97.8 °F (36.6 °C)-98 °F (36.7 °C)] 97.8 °F (36.6 °C)  Heart Rate:  [65-79] 65  Resp:  [14-28] 18  BP: (115-149)/(52-75) 140/71    Physical Exam:     HEENT: Right periorbital ecchymoses; PERRL, EOMI, sclera clear, nose and oropharynx clear with mucosa moist; tongue  midline  Lungs: CTA except for mildly decreased airflow right lower lobe with minor crackles no global airflow satisfactory and normal SaO2 on room air  Heart: RRR; normal S1-S2 no M's or G's or rub  Abd: Soft, NT/ND, Bowel sounds positive  Ext: no significant findings, though very slowly painful movement of lower extremities with antalgic gait  Neuro: CN grossly intact, no focal deficits      Discharge Disposition     Patient will be discharged home with wife and ample family support.    Patient will be discharged to home with rollator walker and bedside commode which are necessary for home management.    **The patient is physically incapable of utilizing regular toilet facilities.  Use of bedside commode is necessary as he is confined to one room.  Patient's mobility limitations significantly impairs his ability to complete 1 or more activities of daily living.  The patient is able to safely use a rolling walker and his functional mobility deficit is sufficiently resolved with a rolling walker.        Discharge Medications        Discharge Medications        ASK your doctor about these medications        Instructions Start Date   calcium carbonate 600 MG tablet  Commonly known as: OS-CHRISTIAN   600 mg, Oral, Daily      polyethyl glycol-propyl glycol 0.4-0.3 % solution ophthalmic solution (artificial tears)  Commonly known as: SYSTANE   2 drops, Both Eyes, Daily      telmisartan-hydrochlorothiazide 80-12.5 MG per tablet  Commonly known as: MICARDIS HCT  Ask about: Which instructions should I use?   1 tablet, Oral, Daily      Vitamin D-3 25 MCG (1000 UT) capsule   1 capsule, Every 24 Hours               Discharge Diet:      Regular as tolerated    Activity at Discharge:     Up with assistance    Follow-up Appointments     See PCP for follow-up in 5 days  Follow-up with orthopedic surgeon in 2 weeks    Test Results Pending at Discharge     None      Time Spent:      35 minutes        Andres Leong MD  [facility]  [facility]  11/11/2023  09:50 EST

## 2023-11-11 NOTE — CASE MANAGEMENT/SOCIAL WORK
Continued Stay Note   [facility]     Patient Name: Anthony Lugo  MRN: [id number]  Today's Date: 11/11/2023    Admit Date: 11/9/2023    Plan: home with spouse and [facility]- ordered and accept.  ian ROBERSON and BSALFRED delivered 11/11   Discharge Plan       Row Name 11/11/23 1719       Plan    Plan home with spouse and [facility]- ordered and accept.  ian ROBERSON and RENEEC delivered 11/11    Plan Comments Spoke with dr. Leong who was in agreement with HH. CM delivered BSC and RW to bedside from onsite DME closet.  REviewed HH- No pref. referral sent to [facility] who was able to accept for Pt/OT.                      Expected Discharge Date and Time       Expected Discharge Date Expected Discharge Time    Nov 11, 2023               Brissa Diez RN

## 2023-11-11 NOTE — PLAN OF CARE
Goal Outcome Evaluation:      Patient resting abed with no concerns voiced during this shift.  Spouse Marzena is at bedside and spending the night.  Patient is alert and oriented and is able to voice wants and needs.  He prefers to not be repositioned by the staff related to fx. pelvis.  Periorbital swelling is present around the right eye.  Area is also bruised.  Laceration to the back of the head has no drainage present and is open to air. Neurological assessments are normal at this time.  Continuing to monitor for the remainder of the shift. Call light is in reach.      Progress: improving

## 2023-11-11 NOTE — PLAN OF CARE
Goal Outcome Evaluation:  Pt A&Ox4, v/s stable, spouse at bedside, call light in reach, Rn will monitor.

## 2023-11-11 NOTE — THERAPY TREATMENT NOTE
"Subjective: Pt agreeable to therapeutic plan of care.    Objective:     Bed mobility - Min-A  Transfers - Min-A with rwx  Ambulation -  practiced 5 stairs with 1 HR and HHA     Therapeutic Exercise - 5 Reps B LE AROM lying supine, ap, qs, gs    Vitals: WNL    Pain: R side and pelvic area with activity and meds  Intervention for pain: Repositioned, Increased Activity, and Therapeutic Presence    Education: Provided education on the importance of mobility in the acute care setting, Verbal/Tactile Cues, Transfer Training, and Gait Training, demonstrated how to keep legs together,  HOB elevated and do more of a long sit for supine<>sit.    Assessment: Anthony Lugo presents with functional mobility impairments which indicate the need for skilled intervention. Tolerating session today without incident. Patient a little delayed in processing instructions. Req constant vc's for proper  hand placement. Able to go up/down 5 stairs with 1  HR and HHA on opposite site with step to gait and no incr in pain. Will need rwx and 3n1 commode at dc.Will continue to follow and progress as tolerated.     Plan/Recommendations:   Low Intensity Therapy recommended post-acute care - This is recommended as therapy feels this patient would require 2-3 visits per week. OP or HH would be the best option depending on patient's home bound status. Consider, if the patient has other  \"skilled\" needs such as wounds, IV antibiotics, etc. Combined with \"low intensity\" could also equate to a SNF. If patient is medically complex, consider LTAC.. Pt requires rolling walker and BSC at discharge.     Pt desires Home with family assist and and Home Health at discharge. Pt cooperative; agreeable to therapeutic recommendations and plan of care.         Basic Mobility 6-click:  Rollin = Total, A lot = 2, A little = 3; 4 = None  Supine>Sit:   1 = Total, A lot = 2, A little = 3; 4 = None   Sit>Stand with arms:  1 = Total, A lot = 2, A little = 3; " 4 = None  Bed>Chair:   1 = Total, A lot = 2, A little = 3; 4 = None  Ambulate in room:  1 = Total, A lot = 2, A little = 3; 4 = None  3-5 Steps with railin = Total, A lot = 2, A little = 3; 4 = None  Score: 18  Modified [place]: 4    Post-Tx Position: Supine with HOB Elevated and Call light and personal items within reach  PPE: gloves

## 2023-11-11 NOTE — OUTREACH NOTE
Prep Survey      Flowsheet Row Responses   [facility]? [facility]   Is LACE score < 7 ? Yes   Eligibility Readm Mgmt   Discharge diagnosis subarachnoid hemorrhage   Does the patient have one of the following disease processes/diagnoses(primary or secondary)? Stroke   Does the patient have Home health ordered? Yes   What is the Home health agency?  [facility]  Pending -   Is there a DME ordered? Yes   What DME was ordered? [facility] -pending   Prep survey completed? Yes            IRENE AIKEN - Registered Nurse

## 2023-11-12 ENCOUNTER — HOME CARE VISIT (OUTPATIENT)
Dept: HOME HEALTH SERVICES | Facility: HOME HEALTHCARE | Age: 83
End: 2023-11-12
Payer: MEDICARE

## 2023-11-12 VITALS
SYSTOLIC BLOOD PRESSURE: 110 MMHG | HEART RATE: 70 BPM | RESPIRATION RATE: 18 BRPM | DIASTOLIC BLOOD PRESSURE: 60 MMHG | OXYGEN SATURATION: 98 % | TEMPERATURE: 97.8 F

## 2023-11-12 PROCEDURE — G0151 HHCP-SERV OF PT,EA 15 MIN: HCPCS

## 2023-11-13 ENCOUNTER — READMISSION MANAGEMENT (OUTPATIENT)
Dept: CALL CENTER | Facility: HOSPITAL | Age: 83
End: 2023-11-13
Payer: MEDICARE

## 2023-11-13 ENCOUNTER — HOME CARE VISIT (OUTPATIENT)
Dept: HOME HEALTH SERVICES | Facility: HOME HEALTHCARE | Age: 83
End: 2023-11-13
Payer: MEDICARE

## 2023-11-13 VITALS
DIASTOLIC BLOOD PRESSURE: 62 MMHG | OXYGEN SATURATION: 98 % | HEART RATE: 71 BPM | TEMPERATURE: 98.3 F | SYSTOLIC BLOOD PRESSURE: 122 MMHG

## 2023-11-13 PROCEDURE — G0152 HHCP-SERV OF OT,EA 15 MIN: HCPCS

## 2023-11-13 NOTE — HOME HEALTH
"SOC Note:  Pt is an 82 y/o male pt of Dr. Leong.  Pt sustained a fall on 11/09/23 resulting in scalp lacertation.  Pt initially went to PCP who placed sutures for scalp lac and then trasferred pt as direct admit to [facility] where he was diganosed with subarachnoid hemmorhage and bilateral pelvic fractures.  Pt was deemed to be candidate for conservative healing and no surgery was recommended.  PT noted pt to have small laceration to posterior scalp, closed with sutures via Dr. Leong and to be removed by Dr. Leong at next follow up.  Pt has no other wounds besides bruising from his fall.  Pt able to ambulate with wheeled walker and close S but has antalgic gait due to pain resulting from pelvic fractures.  Pt also states he has pain in ribs from previous injury made worse by this fall. PT discussed obtaining abdominal binder for mild compression to torso to manage pain in ribs.  Pt and pt's wife verbalized understanding.  PT assisted pt with modified technique for bed mobility using stool and assisted with application of BSC over commode to assist with sit to stand when toileting.  Pt will benefit from continued skilled home care PT for strengthening, gait/balance training, progression of mobility and safety education.     Home Health ordered for: disciplines 1d1, 2w1, 1w1, 2w1, 1w1 / OT eval on referral    Reason for Hosp/Primary Dx/Co-morbidities: Fall resulting in subarachnoid hemmorhage and bilateral pelvic fractures    Focus of Care: impaired mobility and falls risk related to subarachnoid hemmorhage and bilateral pelvic fractures    Patient's goal(s):  Pt reports his primary goal with home care PT is to \"participate in and be aware of what can help me heal quicker\"    Current Functional status/mobility/DME: Pt now ambulatory with wheeled walker and CGA for safety.      HB status/Living Arrangements: Pt lives with his wife in home with basement but pt has no need to access basement at this time.  Pt now " homebound due to falls risk, pain, limited upright tolerance.    Skin Integrity/wound status: head laceration clean/intact with sutures intact    Code Status: full    Fall Risk/Safety concerns: medium    Medication issues/Concerns:  no concerns    Plan for next visit: strengthening, gait/balance training, progression of mobility to tolerance

## 2023-11-13 NOTE — OUTREACH NOTE
Stroke Week 1 Survey      Flowsheet Row Responses   [facility] patient discharged from? [facility]   Does the patient have one of the following disease processes/diagnoses(primary or secondary)? Stroke   Week 1 attempt successful? Yes   Call start time 1450   Call end time 1458   Discharge diagnosis subarachnoid hemorrhage   Person spoke with today (if not patient) and relationship patient   Meds reviewed with patient/caregiver? Yes   Is the patient having any side effects they believe may be caused by any medication additions or changes? No   Does the patient have all medications ordered at discharge? Yes   Is the patient taking all medications as directed (includes completed medication regime)? Yes   Comments regarding appointments Pt will see orthopedics in 2 weeks.   Does the patient have a primary care provider?  Yes   Does the patient have an appointment with their PCP within 7 days of discharge? Yes   Comments regarding PCP Pt will see PCP on thursday   Has the patient kept scheduled appointments due by today? Yes   What is the Home health agency?  [facility]  Pending -   Has home health visited the patient within 72 hours of discharge? Yes   Home health comments Outpt and PT have visited   What DME was ordered? [facility]   Psychosocial issues? No   Does the patient require any assistance with activities of daily living such as eating, bathing, dressing, walking, etc.? Yes   Does the patient have any residual symptoms from stroke/TIA? Yes   Residual symptoms comments Pt is needing walker for ambulation   Comments patient is ambulating with walker   Did the patient receive a copy of their discharge instructions? No  [Pt was provided with HIM to get another copy.  We discussed information on AVS.  Numbers to  NCC and MyChart were provided during this call.]   Nursing interventions Reviewed instructions with patient   What is the patient's perception of their health status since  discharge? Improving   Is the patient/caregiver able to teach back signs and symptoms related to disease process for when to call 911? Yes   If the patient is a current smoker, are they able to teach back resources for cessation? Not a smoker   Is the patient/caregiver able to teach back the hierarchy of who to call/visit for symptoms/problems? PCP, Specialist, Home health nurse, Urgent Care, ED, 911 Yes   Week 1 call completed? Yes   Is the patient interested in additional calls from an ambulatory ? No   Would this patient benefit from a Referral to [facility] Social Work? No   Wrap up additional comments Pt is doing well at home per spouse.  She denies needs, other than no AVS.  She was provided assistance with this.  denies needs at end of call.   Call end time [id number]            GORAN TY - Registered Nurse

## 2023-11-14 ENCOUNTER — HOME CARE VISIT (OUTPATIENT)
Dept: HOME HEALTH SERVICES | Facility: HOME HEALTHCARE | Age: 83
End: 2023-11-14
Payer: MEDICARE

## 2023-11-14 VITALS
HEART RATE: 87 BPM | OXYGEN SATURATION: 98 % | TEMPERATURE: 97.7 F | DIASTOLIC BLOOD PRESSURE: 70 MMHG | SYSTOLIC BLOOD PRESSURE: 136 MMHG

## 2023-11-14 PROCEDURE — G0151 HHCP-SERV OF PT,EA 15 MIN: HCPCS

## 2023-11-14 NOTE — HOME HEALTH
Routine Visit Note:    Skill/education provided: bed mobility, transfer training, balance and gait training,     Patient/caregiver response: tolerated with improved tech.    Plan for next visit: thera ex, transfer training, balance and gait training.

## 2023-11-15 NOTE — HOME HEALTH
Pt is an 82 yo male who lives in a 2 story home with spouse.   Pt recently  sustained a fall on 11/09/23 resulting in scalp lacertation. Pt initially went to PCP who placed sutures for scalp lac and then trasferred pt as direct admit to [facility] where he was diganosed with subarachnoid hemmorhage and bilateral pelvic fractures.        PLOF pt independent with all ADLS      Currently pt independent with feeding groooming and toileting.   Pt requires MIN assist with bathing and dressing and max assist with IADLs      Pt does not beileve futher OT intervention is needed as pts spouse assists pt with self care as needed.  OT did instr and educate pt on LB AE for independence with LB bathing and dressing however spouse willing and able to assist pt with self care as needed.      OT evaluation only      Pt denies any falls or med changes

## 2023-11-17 ENCOUNTER — HOME CARE VISIT (OUTPATIENT)
Dept: HOME HEALTH SERVICES | Facility: HOME HEALTHCARE | Age: 83
End: 2023-11-17
Payer: MEDICARE

## 2023-11-17 VITALS
HEART RATE: 61 BPM | DIASTOLIC BLOOD PRESSURE: 80 MMHG | TEMPERATURE: 97.5 F | SYSTOLIC BLOOD PRESSURE: 128 MMHG | OXYGEN SATURATION: 98 %

## 2023-11-17 PROCEDURE — G0157 HHC PT ASSISTANT EA 15: HCPCS

## 2023-11-21 ENCOUNTER — HOME CARE VISIT (OUTPATIENT)
Dept: HOME HEALTH SERVICES | Facility: HOME HEALTHCARE | Age: 83
End: 2023-11-21
Payer: MEDICARE

## 2023-11-21 PROCEDURE — G0157 HHC PT ASSISTANT EA 15: HCPCS

## 2023-11-22 NOTE — HOME HEALTH
Routine Visit Note:   Patient was in the living room and said come in.   Patient rated his pain as 2/10.    Skill/education provided: Patient was instructed in therapeutic exercises, safe transfers and ambulation with an appropriate assist device.    Patient/caregiver response: Patient/caregiver understood instructions and performed exercises well.    Plan for next visit:  Advance gait and strengthening exercise as patient pain and tolerance allows.

## 2023-11-28 ENCOUNTER — HOME CARE VISIT (OUTPATIENT)
Dept: HOME HEALTH SERVICES | Facility: HOME HEALTHCARE | Age: 83
End: 2023-11-28
Payer: MEDICARE

## 2023-11-28 PROCEDURE — G0157 HHC PT ASSISTANT EA 15: HCPCS

## 2023-11-29 VITALS
OXYGEN SATURATION: 99 % | SYSTOLIC BLOOD PRESSURE: 132 MMHG | DIASTOLIC BLOOD PRESSURE: 26 MMHG | TEMPERATURE: 97.6 F | HEART RATE: 54 BPM

## 2023-12-01 ENCOUNTER — HOME CARE VISIT (OUTPATIENT)
Dept: HOME HEALTH SERVICES | Facility: HOME HEALTHCARE | Age: 83
End: 2023-12-01
Payer: MEDICARE

## 2023-12-01 PROCEDURE — G0157 HHC PT ASSISTANT EA 15: HCPCS

## 2023-12-02 VITALS
SYSTOLIC BLOOD PRESSURE: 128 MMHG | DIASTOLIC BLOOD PRESSURE: 78 MMHG | OXYGEN SATURATION: 99 % | TEMPERATURE: 97.6 F | HEART RATE: 57 BPM

## 2023-12-05 ENCOUNTER — HOME CARE VISIT (OUTPATIENT)
Dept: HOME HEALTH SERVICES | Facility: HOME HEALTHCARE | Age: 83
End: 2023-12-05
Payer: MEDICARE

## 2023-12-05 VITALS
DIASTOLIC BLOOD PRESSURE: 50 MMHG | SYSTOLIC BLOOD PRESSURE: 118 MMHG | OXYGEN SATURATION: 98 % | RESPIRATION RATE: 18 BRPM | HEART RATE: 53 BPM | TEMPERATURE: 97.8 F

## 2023-12-05 PROCEDURE — G0151 HHCP-SERV OF PT,EA 15 MIN: HCPCS

## 2023-12-05 NOTE — HOME HEALTH
Pt reports feeling well and states no pain.  Pt reports appreciation for all therapy provided.  Pt states he has resumed normal daily activities with very minimal pain and no limitations.  Pt is now independent with all mobility and self care without use of AD.  Pt verbalized understanding and signed discharge agreement for DC from PT services as of 12/05/23.  Pt has no further questions or concerns at this time.

## 2023-12-14 ENCOUNTER — HOSPITAL ENCOUNTER (OUTPATIENT)
Dept: CT IMAGING | Facility: HOSPITAL | Age: 83
Discharge: HOME | End: 2023-12-14
Admitting: NURSE PRACTITIONER
Payer: MEDICARE

## 2023-12-14 DIAGNOSIS — S06.6X9A SUBARACHNOID HEMORRHAGE AFTER TRAUMATIC INJURY WITHOUT OPEN INTRACRANIAL WOUND, WITH PROLONGED LOSS OF CONSCIOUSNESS AND RETURN TO PRE-EXISTING LEVEL OF CONSCIOUSNESS: ICD-10-CM

## 2023-12-14 PROCEDURE — 70450 CT HEAD/BRAIN W/O DYE: CPT
